# Patient Record
Sex: MALE | Race: WHITE | Employment: UNEMPLOYED | URBAN - METROPOLITAN AREA
[De-identification: names, ages, dates, MRNs, and addresses within clinical notes are randomized per-mention and may not be internally consistent; named-entity substitution may affect disease eponyms.]

---

## 2017-01-31 RX ORDER — LISINOPRIL AND HYDROCHLOROTHIAZIDE 25; 20 MG/1; MG/1
1 TABLET ORAL DAILY
Qty: 90 TABLET | Refills: 0 | Status: SHIPPED | OUTPATIENT
Start: 2017-01-31 | End: 2017-02-03 | Stop reason: SDUPTHER

## 2017-02-03 ENCOUNTER — OFFICE VISIT (OUTPATIENT)
Dept: FAMILY MEDICINE CLINIC | Age: 52
End: 2017-02-03

## 2017-02-03 VITALS
BODY MASS INDEX: 32.87 KG/M2 | DIASTOLIC BLOOD PRESSURE: 70 MMHG | OXYGEN SATURATION: 96 % | HEART RATE: 75 BPM | WEIGHT: 256 LBS | SYSTOLIC BLOOD PRESSURE: 108 MMHG

## 2017-02-03 DIAGNOSIS — L72.3 SEBACEOUS CYST: ICD-10-CM

## 2017-02-03 DIAGNOSIS — I10 ESSENTIAL HYPERTENSION: Primary | ICD-10-CM

## 2017-02-03 PROCEDURE — 99213 OFFICE O/P EST LOW 20 MIN: CPT | Performed by: FAMILY MEDICINE

## 2017-02-03 RX ORDER — LISINOPRIL AND HYDROCHLOROTHIAZIDE 25; 20 MG/1; MG/1
1 TABLET ORAL DAILY
Qty: 90 TABLET | Refills: 3 | Status: SHIPPED | OUTPATIENT
Start: 2017-02-03 | End: 2018-02-09 | Stop reason: SDUPTHER

## 2017-02-03 RX ORDER — SILDENAFIL 100 MG/1
TABLET, FILM COATED ORAL
Qty: 30 TABLET | Refills: 0 | Status: SHIPPED | OUTPATIENT
Start: 2017-02-03 | End: 2019-07-11

## 2017-02-27 ENCOUNTER — OFFICE VISIT (OUTPATIENT)
Dept: ENT CLINIC | Age: 52
End: 2017-02-27

## 2017-02-27 VITALS
DIASTOLIC BLOOD PRESSURE: 72 MMHG | WEIGHT: 258 LBS | HEIGHT: 75 IN | BODY MASS INDEX: 32.08 KG/M2 | SYSTOLIC BLOOD PRESSURE: 118 MMHG | RESPIRATION RATE: 20 BRPM

## 2017-02-27 DIAGNOSIS — L98.9 SKIN LESION: Primary | ICD-10-CM

## 2017-02-27 PROCEDURE — 99203 OFFICE O/P NEW LOW 30 MIN: CPT | Performed by: OTOLARYNGOLOGY

## 2017-02-27 ASSESSMENT — ENCOUNTER SYMPTOMS
ALLERGIC/IMMUNOLOGIC NEGATIVE: 1
FACIAL SWELLING: 1
EYES NEGATIVE: 1
RESPIRATORY NEGATIVE: 1
SORE THROAT: 0
SINUS PRESSURE: 0

## 2017-03-24 ENCOUNTER — HOSPITAL ENCOUNTER (OUTPATIENT)
Dept: SURGERY | Age: 52
Discharge: HOME | End: 2017-03-24
Attending: OTOLARYNGOLOGY | Admitting: OTOLARYNGOLOGY

## 2017-03-24 VITALS
DIASTOLIC BLOOD PRESSURE: 83 MMHG | TEMPERATURE: 97.2 F | WEIGHT: 261.8 LBS | HEIGHT: 75 IN | OXYGEN SATURATION: 97 % | RESPIRATION RATE: 14 BRPM | SYSTOLIC BLOOD PRESSURE: 119 MMHG | HEART RATE: 58 BPM | BODY MASS INDEX: 32.55 KG/M2

## 2017-03-24 PROCEDURE — 14040 TIS TRNFR F/C/C/M/N/A/G/H/F: CPT | Performed by: OTOLARYNGOLOGY

## 2017-03-24 RX ORDER — ACETAMINOPHEN 325 MG/1
650 TABLET ORAL EVERY 4 HOURS PRN
Status: DISCONTINUED | OUTPATIENT
Start: 2017-03-24 | End: 2017-03-25 | Stop reason: HOSPADM

## 2017-03-24 RX ORDER — CEPHALEXIN 500 MG/1
500 CAPSULE ORAL 3 TIMES DAILY
Qty: 15 CAPSULE | Refills: 0 | Status: SHIPPED | OUTPATIENT
Start: 2017-03-24 | End: 2018-01-17

## 2017-03-24 RX ORDER — ONDANSETRON 2 MG/ML
4 INJECTION INTRAMUSCULAR; INTRAVENOUS EVERY 6 HOURS PRN
Status: DISCONTINUED | OUTPATIENT
Start: 2017-03-24 | End: 2017-03-25 | Stop reason: HOSPADM

## 2017-03-24 RX ORDER — ACETAMINOPHEN AND CODEINE PHOSPHATE 300; 30 MG/1; MG/1
1-2 TABLET ORAL EVERY 6 HOURS PRN
Qty: 15 TABLET | Refills: 0 | Status: SHIPPED | OUTPATIENT
Start: 2017-03-24 | End: 2017-03-29 | Stop reason: ALTCHOICE

## 2017-03-24 ASSESSMENT — PAIN DESCRIPTION - DESCRIPTORS: DESCRIPTORS: DISCOMFORT

## 2017-03-24 ASSESSMENT — PAIN - FUNCTIONAL ASSESSMENT: PAIN_FUNCTIONAL_ASSESSMENT: 0-10

## 2017-03-29 ENCOUNTER — OFFICE VISIT (OUTPATIENT)
Dept: ENT CLINIC | Age: 52
End: 2017-03-29

## 2017-03-29 VITALS — HEIGHT: 75 IN | BODY MASS INDEX: 32.33 KG/M2 | WEIGHT: 260 LBS

## 2017-03-29 DIAGNOSIS — D23.30 CYST, DERMOID, FACE: Primary | ICD-10-CM

## 2017-03-29 PROCEDURE — 99024 POSTOP FOLLOW-UP VISIT: CPT | Performed by: OTOLARYNGOLOGY

## 2018-01-17 ENCOUNTER — OFFICE VISIT (OUTPATIENT)
Dept: FAMILY MEDICINE CLINIC | Age: 53
End: 2018-01-17

## 2018-01-17 VITALS
WEIGHT: 272 LBS | TEMPERATURE: 99 F | HEART RATE: 88 BPM | OXYGEN SATURATION: 98 % | BODY MASS INDEX: 34 KG/M2 | DIASTOLIC BLOOD PRESSURE: 70 MMHG | SYSTOLIC BLOOD PRESSURE: 102 MMHG

## 2018-01-17 DIAGNOSIS — J01.90 ACUTE BACTERIAL SINUSITIS: ICD-10-CM

## 2018-01-17 DIAGNOSIS — J45.901 ASTHMATIC BRONCHITIS WITH ACUTE EXACERBATION, UNSPECIFIED ASTHMA SEVERITY, UNSPECIFIED WHETHER PERSISTENT: Primary | ICD-10-CM

## 2018-01-17 DIAGNOSIS — B96.89 ACUTE BACTERIAL SINUSITIS: ICD-10-CM

## 2018-01-17 PROCEDURE — 99213 OFFICE O/P EST LOW 20 MIN: CPT | Performed by: FAMILY MEDICINE

## 2018-01-17 RX ORDER — AMOXICILLIN 500 MG/1
1000 CAPSULE ORAL 2 TIMES DAILY
Qty: 40 CAPSULE | Refills: 0 | Status: SHIPPED | OUTPATIENT
Start: 2018-01-17 | End: 2018-01-27

## 2018-01-17 RX ORDER — PREDNISONE 20 MG/1
TABLET ORAL
Qty: 15 TABLET | Refills: 0 | Status: SHIPPED | OUTPATIENT
Start: 2018-01-17 | End: 2018-06-08

## 2018-01-17 ASSESSMENT — ENCOUNTER SYMPTOMS
COUGH: 1
RHINORRHEA: 1

## 2018-01-17 NOTE — PROGRESS NOTES
Subjective:      Patient ID: Kristen Lara is a 45 y.o. male. Patient presents for acute medical problem. Medical assistant notes reviewed. URI    This is a new problem. Episode onset: since January 13. Associated symptoms include coughing, headaches, rhinorrhea and sneezing. Associated symptoms comments: Chills, body aches. Treatments tried: Coricidin HBP. The treatment provided mild relief. Patient originally was having cough and congestion now in the last several days is up to his head. He's having headaches associated with this. Patient denies any fevers but has been having chills. His cough is productive in nature. Review of Systems   HENT: Positive for rhinorrhea and sneezing. Respiratory: Positive for cough. Neurological: Positive for headaches. No Known Allergies    Objective:   Physical Exam   Constitutional: He appears well-developed and well-nourished. He is cooperative. HENT:   Right Ear: Tympanic membrane and ear canal normal.   Left Ear: Tympanic membrane and ear canal normal.   Nose: Mucosal edema and rhinorrhea present. Right sinus exhibits no maxillary sinus tenderness and no frontal sinus tenderness. Left sinus exhibits no maxillary sinus tenderness and no frontal sinus tenderness. Mouth/Throat: Oropharynx is clear and moist and mucous membranes are normal.   Neck: Neck supple. Pulmonary/Chest: Effort normal. No respiratory distress. He has wheezes (diffuse). Lymphadenopathy:     He has no cervical adenopathy. Neurological: He is alert. Assessment:      Ravindra Cantrell was seen today for uri. Diagnoses and all orders for this visit:    Asthmatic bronchitis with acute exacerbation, unspecified asthma severity, unspecified whether persistent    Acute bacterial sinusitis    Other orders  -     amoxicillin (AMOXIL) 500 MG capsule;  Take 2 capsules by mouth 2 times daily for 10 days  -     predniSONE (DELTASONE) 20 MG tablet; 1 TID for 3 day then 1 BID            Plan:

## 2018-02-09 RX ORDER — LISINOPRIL AND HYDROCHLOROTHIAZIDE 25; 20 MG/1; MG/1
1 TABLET ORAL DAILY
Qty: 30 TABLET | Refills: 0 | Status: SHIPPED | OUTPATIENT
Start: 2018-02-09 | End: 2018-03-14 | Stop reason: SDUPTHER

## 2018-03-14 RX ORDER — LISINOPRIL AND HYDROCHLOROTHIAZIDE 25; 20 MG/1; MG/1
1 TABLET ORAL DAILY
Qty: 30 TABLET | Refills: 0 | Status: SHIPPED | OUTPATIENT
Start: 2018-03-14 | End: 2018-04-12 | Stop reason: SDUPTHER

## 2018-05-18 ENCOUNTER — TELEPHONE (OUTPATIENT)
Dept: FAMILY MEDICINE CLINIC | Age: 53
End: 2018-05-18

## 2018-05-18 RX ORDER — LISINOPRIL AND HYDROCHLOROTHIAZIDE 25; 20 MG/1; MG/1
TABLET ORAL
Qty: 30 TABLET | Refills: 0 | Status: SHIPPED | OUTPATIENT
Start: 2018-05-18 | End: 2018-06-08 | Stop reason: SDUPTHER

## 2018-06-08 ENCOUNTER — OFFICE VISIT (OUTPATIENT)
Dept: FAMILY MEDICINE CLINIC | Age: 53
End: 2018-06-08

## 2018-06-08 VITALS
BODY MASS INDEX: 34.91 KG/M2 | HEIGHT: 74 IN | WEIGHT: 272 LBS | SYSTOLIC BLOOD PRESSURE: 128 MMHG | HEART RATE: 77 BPM | DIASTOLIC BLOOD PRESSURE: 74 MMHG | OXYGEN SATURATION: 97 %

## 2018-06-08 DIAGNOSIS — G56.02 CARPAL TUNNEL SYNDROME OF LEFT WRIST: ICD-10-CM

## 2018-06-08 DIAGNOSIS — I10 ESSENTIAL HYPERTENSION: Primary | ICD-10-CM

## 2018-06-08 DIAGNOSIS — Z13.220 LIPID SCREENING: ICD-10-CM

## 2018-06-08 DIAGNOSIS — M72.0 DUPUYTREN'S CONTRACTURE OF LEFT HAND: ICD-10-CM

## 2018-06-08 DIAGNOSIS — Z12.11 SCREENING FOR COLON CANCER: ICD-10-CM

## 2018-06-08 DIAGNOSIS — Z12.5 PROSTATE CANCER SCREENING: ICD-10-CM

## 2018-06-08 PROCEDURE — 99214 OFFICE O/P EST MOD 30 MIN: CPT | Performed by: FAMILY MEDICINE

## 2018-06-08 RX ORDER — LISINOPRIL AND HYDROCHLOROTHIAZIDE 25; 20 MG/1; MG/1
TABLET ORAL
Qty: 30 TABLET | Refills: 11 | Status: SHIPPED | OUTPATIENT
Start: 2018-06-08 | End: 2019-07-11 | Stop reason: SDUPTHER

## 2018-06-13 ENCOUNTER — HOSPITAL ENCOUNTER (OUTPATIENT)
Dept: OTHER | Age: 53
Discharge: HOME | End: 2018-06-13
Attending: FAMILY MEDICINE | Admitting: FAMILY MEDICINE

## 2018-06-13 DIAGNOSIS — Z13.220 LIPID SCREENING: ICD-10-CM

## 2018-06-13 DIAGNOSIS — I10 ESSENTIAL HYPERTENSION: ICD-10-CM

## 2018-06-13 LAB
A/G RATIO: 1.3 (ref 1.1–2.2)
ALBUMIN SERPL-MCNC: 4.8 G/DL (ref 3.4–5)
ALP BLD-CCNC: 92 U/L (ref 40–129)
ALT SERPL-CCNC: 38 U/L (ref 10–40)
ANION GAP SERPL CALCULATED.3IONS-SCNC: 14 MMOL/L (ref 3–16)
AST SERPL-CCNC: 27 U/L (ref 15–37)
BILIRUB SERPL-MCNC: 0.7 MG/DL (ref 0–1)
BUN BLDV-MCNC: 11 MG/DL (ref 7–20)
CALCIUM SERPL-MCNC: 10 MG/DL (ref 8.3–10.6)
CHLORIDE BLD-SCNC: 99 MMOL/L (ref 99–110)
CHOLESTEROL, TOTAL: 185 MG/DL (ref 0–199)
CO2: 27 MMOL/L (ref 21–32)
CREAT SERPL-MCNC: 0.9 MG/DL (ref 0.9–1.3)
GFR AFRICAN AMERICAN: >60
GFR NON-AFRICAN AMERICAN: >60
GLOBULIN: 3.6 G/DL
GLUCOSE BLD-MCNC: 91 MG/DL (ref 70–99)
HDLC SERPL-MCNC: 43 MG/DL (ref 40–60)
LDL CHOLESTEROL CALCULATED: 109 MG/DL
POTASSIUM SERPL-SCNC: 4.3 MMOL/L (ref 3.5–5.1)
PROSTATE SPECIFIC ANTIGEN: 0.41 NG/ML (ref 0–4)
SODIUM BLD-SCNC: 140 MMOL/L (ref 136–145)
TOTAL PROTEIN: 8.4 G/DL (ref 6.4–8.2)
TRIGL SERPL-MCNC: 166 MG/DL (ref 0–150)
VLDLC SERPL CALC-MCNC: 33 MG/DL

## 2018-08-01 ENCOUNTER — CLINICAL DOCUMENTATION (OUTPATIENT)
Dept: OTHER | Facility: CLINIC | Age: 53
End: 2018-08-01

## 2018-08-01 ENCOUNTER — HOSPITAL ENCOUNTER (OUTPATIENT)
Dept: ENDOSCOPY | Age: 53
Discharge: HOME | End: 2018-08-01
Attending: INTERNAL MEDICINE | Admitting: INTERNAL MEDICINE

## 2018-08-01 RX ORDER — HYDROMORPHONE HCL 110MG/55ML
0.5 PATIENT CONTROLLED ANALGESIA SYRINGE INTRAVENOUS EVERY 5 MIN PRN
Status: DISCONTINUED | OUTPATIENT
Start: 2018-08-01 | End: 2018-08-02 | Stop reason: HOSPADM

## 2018-08-01 RX ORDER — ONDANSETRON 2 MG/ML
4 INJECTION INTRAMUSCULAR; INTRAVENOUS
Status: ACTIVE | OUTPATIENT
Start: 2018-08-01 | End: 2018-08-01

## 2018-08-01 RX ORDER — SODIUM CHLORIDE 0.9 % (FLUSH) 0.9 %
10 SYRINGE (ML) INJECTION EVERY 12 HOURS SCHEDULED
Status: CANCELLED | OUTPATIENT
Start: 2018-08-01

## 2018-08-01 RX ORDER — MEPERIDINE HYDROCHLORIDE 25 MG/ML
12.5 INJECTION INTRAMUSCULAR; INTRAVENOUS; SUBCUTANEOUS EVERY 5 MIN PRN
Status: DISCONTINUED | OUTPATIENT
Start: 2018-08-01 | End: 2018-08-02 | Stop reason: HOSPADM

## 2018-08-01 RX ORDER — LIDOCAINE HYDROCHLORIDE 10 MG/ML
1 INJECTION, SOLUTION EPIDURAL; INFILTRATION; INTRACAUDAL; PERINEURAL
Status: CANCELLED | OUTPATIENT
Start: 2018-08-01 | End: 2018-08-01

## 2018-08-01 RX ORDER — LABETALOL HYDROCHLORIDE 5 MG/ML
5 INJECTION, SOLUTION INTRAVENOUS EVERY 10 MIN PRN
Status: DISCONTINUED | OUTPATIENT
Start: 2018-08-01 | End: 2018-08-02 | Stop reason: HOSPADM

## 2018-08-01 RX ORDER — SODIUM CHLORIDE, SODIUM LACTATE, POTASSIUM CHLORIDE, CALCIUM CHLORIDE 600; 310; 30; 20 MG/100ML; MG/100ML; MG/100ML; MG/100ML
INJECTION, SOLUTION INTRAVENOUS CONTINUOUS
Status: CANCELLED | OUTPATIENT
Start: 2018-08-01

## 2018-08-01 RX ORDER — OXYCODONE HYDROCHLORIDE AND ACETAMINOPHEN 5; 325 MG/1; MG/1
1 TABLET ORAL
Status: ACTIVE | OUTPATIENT
Start: 2018-08-01 | End: 2018-08-01

## 2018-08-01 RX ORDER — SODIUM CHLORIDE 0.9 % (FLUSH) 0.9 %
10 SYRINGE (ML) INJECTION PRN
Status: CANCELLED | OUTPATIENT
Start: 2018-08-01

## 2018-08-01 RX ORDER — HYDRALAZINE HYDROCHLORIDE 20 MG/ML
5 INJECTION INTRAMUSCULAR; INTRAVENOUS EVERY 10 MIN PRN
Status: DISCONTINUED | OUTPATIENT
Start: 2018-08-01 | End: 2018-08-02 | Stop reason: HOSPADM

## 2018-08-01 NOTE — ANESTHESIA POST-OP
Anesthesia Post-op Note    Patient: Mario Rock  MRN: [id number]  YOB: 1965  Date of evaluation: 8/1/2018  Time:  8:55 AM     Procedure(s) Performed:     Last Vitals: There were no vitals taken for this visit.     Paige Phase I:      Paige Phase II:      Anesthesia Post Evaluation    Final anesthesia type: MAC  Patient location during evaluation: PACU  Patient participation: complete - patient participated  Level of consciousness: awake  Airway patency: patent  Nausea & Vomiting: no vomiting and no nausea  Complications: no  Cardiovascular status: hemodynamically stable  Respiratory status: acceptable  Hydration status: stable        Adriana Andre MD  8:55 AM

## 2019-06-12 RX ORDER — LISINOPRIL AND HYDROCHLOROTHIAZIDE 25; 20 MG/1; MG/1
TABLET ORAL
Qty: 30 TABLET | Refills: 0 | Status: SHIPPED | OUTPATIENT
Start: 2019-06-12 | End: 2019-07-11

## 2019-07-11 ENCOUNTER — OFFICE VISIT (OUTPATIENT)
Dept: FAMILY MEDICINE CLINIC | Age: 54
End: 2019-07-11
Payer: COMMERCIAL

## 2019-07-11 VITALS
WEIGHT: 282 LBS | BODY MASS INDEX: 35.96 KG/M2 | HEART RATE: 70 BPM | SYSTOLIC BLOOD PRESSURE: 130 MMHG | OXYGEN SATURATION: 97 % | DIASTOLIC BLOOD PRESSURE: 80 MMHG

## 2019-07-11 DIAGNOSIS — Z12.5 SCREENING FOR MALIGNANT NEOPLASM OF PROSTATE: ICD-10-CM

## 2019-07-11 DIAGNOSIS — Z13.220 SCREENING FOR LIPID DISORDERS: ICD-10-CM

## 2019-07-11 DIAGNOSIS — I10 ESSENTIAL HYPERTENSION: Primary | ICD-10-CM

## 2019-07-11 PROCEDURE — 99213 OFFICE O/P EST LOW 20 MIN: CPT | Performed by: FAMILY MEDICINE

## 2019-07-11 RX ORDER — LISINOPRIL AND HYDROCHLOROTHIAZIDE 25; 20 MG/1; MG/1
TABLET ORAL
Qty: 90 TABLET | Refills: 3 | Status: SHIPPED | OUTPATIENT
Start: 2019-07-11 | End: 2020-08-03 | Stop reason: SDUPTHER

## 2019-07-11 ASSESSMENT — ENCOUNTER SYMPTOMS
CHEST TIGHTNESS: 0
SHORTNESS OF BREATH: 0
CONSTIPATION: 0
SINUS PRESSURE: 0
DIARRHEA: 0
RHINORRHEA: 0
ABDOMINAL PAIN: 0
WHEEZING: 0
BACK PAIN: 0

## 2019-07-25 ENCOUNTER — TELEPHONE (OUTPATIENT)
Dept: FAMILY MEDICINE CLINIC | Age: 54
End: 2019-07-25

## 2019-07-25 NOTE — TELEPHONE ENCOUNTER
Patient advised his lab results came back ok per Dr. Bharti Hernadez. He will make his next appt with him.

## 2020-08-03 RX ORDER — LISINOPRIL AND HYDROCHLOROTHIAZIDE 25; 20 MG/1; MG/1
TABLET ORAL
Qty: 30 TABLET | Refills: 0 | Status: SHIPPED | OUTPATIENT
Start: 2020-08-03 | End: 2020-09-09 | Stop reason: SDUPTHER

## 2020-08-03 NOTE — TELEPHONE ENCOUNTER
Medication:   Requested Prescriptions      No prescriptions requested or ordered in this encounter      Last Filled:  7/11/19    Patient Phone Number: [phone] (home)     Last appt: 7/11/2019   Next appt: Visit date not found - was due back July 2020    Last OARRS: No flowsheet data found.   PDMP Monitoring:    Last PDMP Devora Standard as Reviewed [facility]):  Review User Review Instant Review Result          Preferred Pharmacy:   [address] [phone] [facility] [phone]  [facility] [address] [facility] [address]  Phone: [phone] Fax: [phone]

## 2020-08-03 NOTE — TELEPHONE ENCOUNTER
lisinopril-hydrochlorothiazide (PRINZIDE;ZESTORETIC) 20-25 MG per tablet  90 tablet  3  7/11/2019      Sig: TAKE 1 TABLET BY MOUTH EVERY DAY       Jeffrey in chart      Provider out of the office

## 2020-09-09 RX ORDER — LISINOPRIL AND HYDROCHLOROTHIAZIDE 25; 20 MG/1; MG/1
TABLET ORAL
Qty: 30 TABLET | Refills: 0 | Status: SHIPPED | OUTPATIENT
Start: 2020-09-09 | End: 2020-09-09

## 2020-09-09 NOTE — TELEPHONE ENCOUNTER
Disp  Refills  Start  End     lisinopril-hydroCHLOROthiazide (PRINZIDE;ZESTORETIC) 20-25 MG per tablet  30 tablet  0  8/3/2020      Sig: TAKE 1 TABLET BY MOUTH EVERY DAY       Pharmacy:  [facility] [address] [phone] - F [phone]         Please advise

## 2020-09-10 RX ORDER — LISINOPRIL AND HYDROCHLOROTHIAZIDE 25; 20 MG/1; MG/1
TABLET ORAL
Qty: 90 TABLET | Refills: 0 | Status: SHIPPED | OUTPATIENT
Start: 2020-09-10 | End: 2020-09-18 | Stop reason: SDUPTHER

## 2020-09-18 ENCOUNTER — OFFICE VISIT (OUTPATIENT)
Dept: FAMILY MEDICINE CLINIC | Age: 55
End: 2020-09-18
Payer: COMMERCIAL

## 2020-09-18 VITALS
TEMPERATURE: 99.2 F | SYSTOLIC BLOOD PRESSURE: 136 MMHG | DIASTOLIC BLOOD PRESSURE: 80 MMHG | WEIGHT: 277 LBS | BODY MASS INDEX: 35.33 KG/M2

## 2020-09-18 PROCEDURE — 99396 PREV VISIT EST AGE 40-64: CPT | Performed by: FAMILY MEDICINE

## 2020-09-18 RX ORDER — LISINOPRIL AND HYDROCHLOROTHIAZIDE 25; 20 MG/1; MG/1
TABLET ORAL
Qty: 90 TABLET | Refills: 3 | Status: SHIPPED | OUTPATIENT
Start: 2020-09-18 | End: 2021-01-14 | Stop reason: SDUPTHER

## 2020-09-18 ASSESSMENT — ENCOUNTER SYMPTOMS
CONSTIPATION: 0
SHORTNESS OF BREATH: 0
CHEST TIGHTNESS: 0
RHINORRHEA: 0
DIARRHEA: 0
WHEEZING: 0
BACK PAIN: 1

## 2020-09-18 NOTE — PROGRESS NOTES
2020    Raynette Felty (:  1965) is a 60 y.o. male, here for a preventive medicine evaluation. Patient Active Problem List   Diagnosis    HTN (hypertension)    Impotence of organic origin    Skin lesion    Cyst, dermoid, face    Carpal tunnel syndrome of left wrist    Dupuytren's contracture of left hand       Review of Systems   Constitutional: Negative for activity change, fatigue and unexpected weight change. HENT: Negative for congestion, postnasal drip and rhinorrhea. Respiratory: Negative for chest tightness, shortness of breath and wheezing. Cardiovascular: Negative for chest pain, palpitations and leg swelling. Gastrointestinal: Negative for constipation and diarrhea. Genitourinary: Negative for difficulty urinating. Musculoskeletal: Positive for arthralgias (knees, ankles) and back pain ( occ'l). Allergic/Immunologic: Negative for environmental allergies. Neurological: Negative for dizziness, light-headedness and headaches. Psychiatric/Behavioral: Negative for dysphoric mood and sleep disturbance. The patient is not nervous/anxious. Prior to Visit Medications    Medication Sig Taking?  Authorizing Provider   lisinopril-hydroCHLOROthiazide (PRINZIDE;ZESTORETIC) 20-25 MG per tablet One daily Yes Nadeem Solis MD        No Known Allergies    Past Medical History:   Diagnosis Date    HTN (hypertension)        Past Surgical History:   Procedure Laterality Date    EXCISION OF FACIAL MASS Right 2017    right cheek    KNEE ARTHROSCOPY Right        Social History     Socioeconomic History    Marital status: Single     Spouse name: Not on file    Number of children: Not on file    Years of education: Not on file    Highest education level: Not on file   Occupational History    Not on file   Social Needs    Financial resource strain: Not on file    Food insecurity     Worry: Not on file     Inability: Not on file   [employer] needs Medical: Not on file     Non-medical: Not on file   Tobacco Use    Smoking status: Never Smoker    Smokeless tobacco: Current User     Types: Chew   Substance and Sexual Activity    Alcohol use: Yes     Alcohol/week: 12.0 standard drinks     Types: 12 Cans of beer per week    Drug use: No    Sexual activity: Not on file   Lifestyle    Physical activity     Days per week: Not on file     Minutes per session: Not on file    Stress: Not on file   Relationships    Social connections     Talks on phone: Not on file     Gets together: Not on file     Attends Mosque service: Not on file     Active member of club or organization: Not on file     Attends meetings of clubs or organizations: Not on file     Relationship status: Not on file    Intimate partner violence     Fear of current or ex partner: Not on file     Emotionally abused: Not on file     Physically abused: Not on file     Forced sexual activity: Not on file   Other Topics Concern    Not on file   Social History Narrative    Not on file        Family History   Problem Relation Age of Onset    High Blood Pressure Mother     Cancer Father     Diabetes Father     High Blood Pressure Father     High Cholesterol Father     Heart Disease Father        ADVANCE DIRECTIVE: N, <no information>    Vitals:    09/18/20 1511   BP: 136/80   Temp: 99.2 °F (37.3 °C)   Weight: 277 lb (125.6 kg)     Estimated body mass index is 35.33 kg/m² as calculated from the following:    Height as of 6/8/18: 6' 2.25\" (1.886 m). Weight as of this encounter: 277 lb (125.6 kg). Physical Exam  Constitutional:       Appearance: He is well-developed. HENT:      Head: Normocephalic and atraumatic. Right Ear: External ear normal.      Left Ear: External ear normal.      Nose: Nose normal.   Neck:      Musculoskeletal: Normal range of motion and neck supple. Thyroid: No thyromegaly. Vascular: No JVD. Trachea: No tracheal deviation.    Cardiovascular: Rate and Rhythm: Normal rate and regular rhythm. Heart sounds: Normal heart sounds. Pulmonary:      Effort: Pulmonary effort is normal. No respiratory distress. Breath sounds: Normal breath sounds. No rales. Lymphadenopathy:      Cervical: No cervical adenopathy. Skin:     General: Skin is warm and dry. Neurological:      General: No focal deficit present. Mental Status: He is alert and oriented to person, place, and time. Psychiatric:         Mood and Affect: Mood normal.         Behavior: Behavior normal.         No flowsheet data found. Lab Results   Component Value Date    CHOL 185 06/13/2018    CHOL 176 10/11/2014    TRIG 166 06/13/2018    TRIG 113 10/11/2014    HDL 43 06/13/2018    HDL 38 10/11/2014    LDLCALC 109 06/13/2018    LDLCALC 115 10/11/2014    GLUCOSE 91 06/13/2018       The 10-year ASCVD risk score (Kayy Littlejohn, et al., 2013) is: 9%    Values used to calculate the score:      Age: 60 years      Sex: Male      Is Non- : No      Diabetic: No      Tobacco smoker: No      Systolic Blood Pressure: 116 mmHg      Is BP treated: Yes      HDL Cholesterol: 43 mg/dL      Total Cholesterol: 185 mg/dL    Immunization History   Administered Date(s) Administered    Tdap (Boostrix, Adacel) 09/17/2014       Health Maintenance   Topic Date Due    Hepatitis C screen  1965    HIV screen  08/07/1978    Shingles Vaccine (1 of 2) 08/07/2013    Potassium monitoring  06/13/2019    Creatinine monitoring  06/13/2019    Flu vaccine (1) 09/01/2020    Lipid screen  06/13/2023    Colon cancer screen colonoscopy  08/01/2023    DTaP/Tdap/Td vaccine (2 - Td) 09/17/2024    Hepatitis A vaccine  Aged Out    Hepatitis B vaccine  Aged Out    Hib vaccine  Aged Out    Meningococcal (ACWY) vaccine  Aged Out    Pneumococcal 0-64 years Vaccine  Aged Out       ASSESSMENT/PLAN:  1.  Essential hypertension  In good control  - lisinopril-hydroCHLOROthiazide (PRINZIDE;ZESTORETIC) 20-25 MG per tablet; One daily  Dispense: 90 tablet; Refill: 3    2. Mixed hyperlipidemia  Mild elevation of LDL cholesterol and elevation of triglycerides on last year's lab work. - Comprehensive Metabolic Panel, Fasting; Future  - CBC Auto Differential; Future  - Lipid, Fasting; Future  - TSH with Reflex; Future    3. Screening for malignant neoplasm of prostate  - Psa screening; Future      Return in about 1 year (around 9/18/2021). An electronic signature was used to authenticate this note.     --Codey Chow MD on 9/18/2020 at 3:29 PM

## 2020-10-08 ENCOUNTER — HOSPITAL ENCOUNTER (OUTPATIENT)
Age: 55
Discharge: HOME | End: 2020-10-08
Payer: COMMERCIAL

## 2020-10-08 LAB
A/G RATIO: 1.3 (ref 1.1–2.2)
ALBUMIN SERPL-MCNC: 4.5 G/DL (ref 3.4–5)
ALP BLD-CCNC: 92 U/L (ref 40–129)
ALT SERPL-CCNC: 41 U/L (ref 10–40)
ANION GAP SERPL CALCULATED.3IONS-SCNC: 12 MMOL/L (ref 3–16)
AST SERPL-CCNC: 34 U/L (ref 15–37)
BASOPHILS ABSOLUTE: 0 K/UL (ref 0–0.2)
BASOPHILS RELATIVE PERCENT: 0.5 %
BILIRUB SERPL-MCNC: 0.7 MG/DL (ref 0–1)
BUN BLDV-MCNC: 11 MG/DL (ref 7–20)
CALCIUM SERPL-MCNC: 9.8 MG/DL (ref 8.3–10.6)
CHLORIDE BLD-SCNC: 103 MMOL/L (ref 99–110)
CHOLESTEROL, FASTING: 183 MG/DL (ref 0–199)
CO2: 25 MMOL/L (ref 21–32)
CREAT SERPL-MCNC: 0.8 MG/DL (ref 0.9–1.3)
EOSINOPHILS ABSOLUTE: 0.1 K/UL (ref 0–0.6)
EOSINOPHILS RELATIVE PERCENT: 1.5 %
GFR AFRICAN AMERICAN: >60
GFR NON-AFRICAN AMERICAN: >60
GLOBULIN: 3.4 G/DL
GLUCOSE FASTING: 103 MG/DL (ref 70–99)
HCT VFR BLD CALC: 45.3 % (ref 40.5–52.5)
HDLC SERPL-MCNC: 40 MG/DL (ref 40–60)
HEMOGLOBIN: 15.7 G/DL (ref 13.5–17.5)
LDL CHOLESTEROL CALCULATED: 127 MG/DL
LYMPHOCYTES ABSOLUTE: 1.1 K/UL (ref 1–5.1)
LYMPHOCYTES RELATIVE PERCENT: 19.5 %
MCH RBC QN AUTO: 31.2 PG (ref 26–34)
MCHC RBC AUTO-ENTMCNC: 34.6 G/DL (ref 31–36)
MCV RBC AUTO: 90.1 FL (ref 80–100)
MONOCYTES ABSOLUTE: 0.5 K/UL (ref 0–1.3)
MONOCYTES RELATIVE PERCENT: 9.6 %
NEUTROPHILS ABSOLUTE: 3.8 K/UL (ref 1.7–7.7)
NEUTROPHILS RELATIVE PERCENT: 68.9 %
PDW BLD-RTO: 13.1 % (ref 12.4–15.4)
PLATELET # BLD: 224 K/UL (ref 135–450)
PMV BLD AUTO: 9.1 FL (ref 5–10.5)
POTASSIUM SERPL-SCNC: 4.8 MMOL/L (ref 3.5–5.1)
PROSTATE SPECIFIC ANTIGEN: 0.36 NG/ML (ref 0–4)
RBC # BLD: 5.02 M/UL (ref 4.2–5.9)
SODIUM BLD-SCNC: 140 MMOL/L (ref 136–145)
TOTAL PROTEIN: 7.9 G/DL (ref 6.4–8.2)
TRIGLYCERIDE, FASTING: 81 MG/DL (ref 0–150)
TSH REFLEX: 1.34 UIU/ML (ref 0.27–4.2)
VLDLC SERPL CALC-MCNC: 16 MG/DL
WBC # BLD: 5.5 K/UL (ref 4–11)

## 2020-10-08 PROCEDURE — 36415 COLL VENOUS BLD VENIPUNCTURE: CPT

## 2020-10-08 PROCEDURE — 80053 COMPREHEN METABOLIC PANEL: CPT

## 2020-10-08 PROCEDURE — 84443 ASSAY THYROID STIM HORMONE: CPT

## 2020-10-08 PROCEDURE — 80061 LIPID PANEL: CPT

## 2020-10-08 PROCEDURE — 85025 COMPLETE CBC W/AUTO DIFF WBC: CPT

## 2020-10-08 PROCEDURE — 84153 ASSAY OF PSA TOTAL: CPT

## 2021-01-14 DIAGNOSIS — I10 ESSENTIAL HYPERTENSION: ICD-10-CM

## 2021-01-14 RX ORDER — LISINOPRIL AND HYDROCHLOROTHIAZIDE 25; 20 MG/1; MG/1
TABLET ORAL
Qty: 90 TABLET | Refills: 3 | Status: SHIPPED | OUTPATIENT
Start: 2021-01-14 | End: 2021-08-17

## 2021-01-14 NOTE — TELEPHONE ENCOUNTER
Disp Refills Start End    lisinopril-hydroCHLOROthiazide (PRINZIDE;ZESTORETIC) 20-25 MG per tablet 90 tablet 3 9/18/2020     Sig: One daily      [facility] [address] -  [phone]       Please advise

## 2021-07-14 ENCOUNTER — HOSPITAL ENCOUNTER (EMERGENCY)
Age: 56
Discharge: HOME | End: 2021-07-14
Attending: EMERGENCY MEDICINE
Payer: COMMERCIAL

## 2021-07-14 ENCOUNTER — HOSPITAL ENCOUNTER (OUTPATIENT)
Dept: GENERAL RADIOLOGY | Age: 56
Discharge: HOME | End: 2021-07-14
Payer: COMMERCIAL

## 2021-07-14 ENCOUNTER — APPOINTMENT (OUTPATIENT)
Dept: CT IMAGING | Age: 56
End: 2021-07-14
Payer: COMMERCIAL

## 2021-07-14 ENCOUNTER — OFFICE VISIT (OUTPATIENT)
Dept: FAMILY MEDICINE CLINIC | Age: 56
End: 2021-07-14
Payer: COMMERCIAL

## 2021-07-14 ENCOUNTER — HOSPITAL ENCOUNTER (OUTPATIENT)
Age: 56
Discharge: HOME | End: 2021-07-14
Payer: COMMERCIAL

## 2021-07-14 VITALS
OXYGEN SATURATION: 96 % | SYSTOLIC BLOOD PRESSURE: 142 MMHG | DIASTOLIC BLOOD PRESSURE: 82 MMHG | HEART RATE: 87 BPM | HEIGHT: 74 IN | BODY MASS INDEX: 31.65 KG/M2 | WEIGHT: 246.6 LBS | RESPIRATION RATE: 25 BRPM | TEMPERATURE: 99.3 F

## 2021-07-14 VITALS — TEMPERATURE: 100.5 F | BODY MASS INDEX: 31.37 KG/M2 | OXYGEN SATURATION: 97 % | WEIGHT: 246 LBS | HEART RATE: 101 BPM

## 2021-07-14 DIAGNOSIS — J20.9 ACUTE BRONCHITIS DUE TO INFECTION: ICD-10-CM

## 2021-07-14 DIAGNOSIS — Z20.822 SUSPECTED COVID-19 VIRUS INFECTION: ICD-10-CM

## 2021-07-14 DIAGNOSIS — J20.9 ACUTE BRONCHITIS DUE TO INFECTION: Primary | ICD-10-CM

## 2021-07-14 DIAGNOSIS — R91.8 MASS OF LEFT LUNG: Primary | ICD-10-CM

## 2021-07-14 LAB
A/G RATIO: 0.7 (ref 1.1–2.2)
ALBUMIN SERPL-MCNC: 3.3 G/DL (ref 3.4–5)
ALP BLD-CCNC: 157 U/L (ref 40–129)
ALT SERPL-CCNC: 31 U/L (ref 10–40)
ANION GAP SERPL CALCULATED.3IONS-SCNC: 12 MMOL/L (ref 3–16)
AST SERPL-CCNC: 21 U/L (ref 15–37)
BASOPHILS ABSOLUTE: 0.1 K/UL (ref 0–0.2)
BASOPHILS RELATIVE PERCENT: 0.5 %
BILIRUB SERPL-MCNC: 0.5 MG/DL (ref 0–1)
BUN BLDV-MCNC: 12 MG/DL (ref 7–20)
CALCIUM SERPL-MCNC: 9.4 MG/DL (ref 8.3–10.6)
CHLORIDE BLD-SCNC: 103 MMOL/L (ref 99–110)
CO2: 22 MMOL/L (ref 21–32)
CREAT SERPL-MCNC: 0.6 MG/DL (ref 0.9–1.3)
EOSINOPHILS ABSOLUTE: 0 K/UL (ref 0–0.6)
EOSINOPHILS RELATIVE PERCENT: 0.4 %
GFR AFRICAN AMERICAN: >60
GFR NON-AFRICAN AMERICAN: >60
GLOBULIN: 4.8 G/DL
GLUCOSE BLD-MCNC: 96 MG/DL (ref 70–99)
HCT VFR BLD CALC: 40.1 % (ref 40.5–52.5)
HEMOGLOBIN: 13.3 G/DL (ref 13.5–17.5)
LYMPHOCYTES ABSOLUTE: 1.1 K/UL (ref 1–5.1)
LYMPHOCYTES RELATIVE PERCENT: 10.3 %
MCH RBC QN AUTO: 28.4 PG (ref 26–34)
MCHC RBC AUTO-ENTMCNC: 33.1 G/DL (ref 31–36)
MCV RBC AUTO: 85.6 FL (ref 80–100)
MONOCYTES ABSOLUTE: 1.2 K/UL (ref 0–1.3)
MONOCYTES RELATIVE PERCENT: 11 %
NEUTROPHILS ABSOLUTE: 8.2 K/UL (ref 1.7–7.7)
NEUTROPHILS RELATIVE PERCENT: 77.8 %
PDW BLD-RTO: 13.3 % (ref 12.4–15.4)
PLATELET # BLD: 426 K/UL (ref 135–450)
PMV BLD AUTO: 7.8 FL (ref 5–10.5)
POTASSIUM SERPL-SCNC: 4.4 MMOL/L (ref 3.5–5.1)
PRO-BNP: 239 PG/ML (ref 0–124)
RBC # BLD: 4.68 M/UL (ref 4.2–5.9)
SODIUM BLD-SCNC: 137 MMOL/L (ref 136–145)
TOTAL PROTEIN: 8.1 G/DL (ref 6.4–8.2)
TROPONIN: <0.01 NG/ML
WBC # BLD: 10.6 K/UL (ref 4–11)

## 2021-07-14 PROCEDURE — 93005 ELECTROCARDIOGRAM TRACING: CPT | Performed by: PHYSICIAN ASSISTANT

## 2021-07-14 PROCEDURE — 71046 X-RAY EXAM CHEST 2 VIEWS: CPT

## 2021-07-14 PROCEDURE — 84484 ASSAY OF TROPONIN QUANT: CPT

## 2021-07-14 PROCEDURE — 83880 ASSAY OF NATRIURETIC PEPTIDE: CPT

## 2021-07-14 PROCEDURE — 6360000004 HC RX CONTRAST MEDICATION: Performed by: PHYSICIAN ASSISTANT

## 2021-07-14 PROCEDURE — 99213 OFFICE O/P EST LOW 20 MIN: CPT | Performed by: NURSE PRACTITIONER

## 2021-07-14 PROCEDURE — 80053 COMPREHEN METABOLIC PANEL: CPT

## 2021-07-14 PROCEDURE — 85025 COMPLETE CBC W/AUTO DIFF WBC: CPT

## 2021-07-14 PROCEDURE — 99282 EMERGENCY DEPT VISIT SF MDM: CPT

## 2021-07-14 PROCEDURE — 36415 COLL VENOUS BLD VENIPUNCTURE: CPT

## 2021-07-14 PROCEDURE — 71260 CT THORAX DX C+: CPT

## 2021-07-14 RX ORDER — AZITHROMYCIN 250 MG/1
250 TABLET, FILM COATED ORAL SEE ADMIN INSTRUCTIONS
Qty: 6 TABLET | Refills: 0 | Status: SHIPPED | OUTPATIENT
Start: 2021-07-14 | End: 2021-07-19

## 2021-07-14 RX ORDER — ALBUTEROL SULFATE 90 UG/1
2 AEROSOL, METERED RESPIRATORY (INHALATION) 4 TIMES DAILY PRN
Qty: 1 INHALER | Refills: 0 | Status: SHIPPED | OUTPATIENT
Start: 2021-07-14 | End: 2021-08-17

## 2021-07-14 RX ADMIN — IOPAMIDOL 75 ML: 755 INJECTION, SOLUTION INTRAVENOUS at 19:53

## 2021-07-14 ASSESSMENT — ENCOUNTER SYMPTOMS
COUGH: 1
SHORTNESS OF BREATH: 1
RHINORRHEA: 0
VOMITING: 0
ABDOMINAL PAIN: 0
CHEST TIGHTNESS: 0
NAUSEA: 0
DIARRHEA: 0

## 2021-07-14 NOTE — ED NOTES
Bed: 07  Expected date:   Expected time:   Means of arrival:   Comments:  Arzella Carrel, RN  07/14/21 Sarah Pacheco

## 2021-07-14 NOTE — ED PROVIDER NOTES
[address] [facility]        Pt Name: Jenni Velasquez  MRN: [id number]  [place] 1965  Date of evaluation: 7/14/2021  Provider: Matteo Rodrigues PA-C  PCP: Rodrigo Mcbride MD  Note Started: 7:44 PM EDT        I have seen and evaluated this patient with my supervising physician No att. providers found. CHIEF COMPLAINT       Chief Complaint   Patient presents with    Abnormal Chest X-ray     pt sent by doctor d/t abnormal chest x ray that was taken today. results showed a mass on one of the lungs. c/o SOB        HISTORY OF PRESENT ILLNESS   (Location, Timing/Onset, Context/Setting, Quality, Duration, Modifying Factors, Severity, Associated Signs and Symptoms)  Note limiting factors. Chief Complaint: Abnormal chest x-ray    Jenni Velasquez is a 60 y.o. male who presents to the emergency department today for evaluation for an abnormal chest x-ray. The patient states that for the past month he has had a cough, and shortness of breath. He states that his cough is dry, there is no sputum production or hemoptysis the patient that he has been trying to get into his primary care physician, but was unsuccessful until today. The patient states that his cough seems to be increasing in frequency, and he states that he is now reporting several episodes of posttussive emesis. The patient denies any spontaneous episodes of emesis. The patient states that he does not have any chest pain, states that he will notice some exertional shortness of breath. The patient states that this seems to be gradually getting worse with over the past month. 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 He does not smoke. The patient does not have any fever or chills. He has no abdominal pain, nausea, vomiting or diarrhea.   He has no urinary symptoms patient denies any IV drug use. The patient otherwise has no complaints at this time. Chest x-ray was performed today and he was sent to the emergency room for further care evaluation    Nursing Notes were all reviewed and agreed with or any disagreements were addressed in the HPI. REVIEW OF SYSTEMS    (2-9 systems for level 4, 10 or more for level 5)     Review of Systems   Constitutional: Negative for activity change, appetite change, chills and fever. HENT: Negative for congestion and rhinorrhea. Respiratory: Positive for cough and shortness of breath. Negative for chest tightness. Cardiovascular: Negative for chest pain. Gastrointestinal: Negative for abdominal pain, diarrhea, nausea and vomiting. Genitourinary: Negative for difficulty urinating, dysuria and hematuria. Positives and Pertinent negatives as per HPI. Except as noted above in the ROS, all other systems were reviewed and negative. PAST MEDICAL HISTORY     Past Medical History:   Diagnosis Date    HTN (hypertension)          SURGICAL HISTORY     Past Surgical History:   Procedure Laterality Date    EXCISION OF FACIAL MASS Right 03/24/2017    right cheek    KNEE ARTHROSCOPY Right 1993         CURRENTMEDICATIONS       Discharge Medication List as of 7/14/2021  9:25 PM      CONTINUE these medications which have NOT CHANGED    Details   lisinopril-hydroCHLOROthiazide (PRINZIDE;ZESTORETIC) 20-25 MG per tablet One daily, Disp-90 tablet, R-3**Patient requests 90 days supply**Normal      azithromycin (ZITHROMAX) 250 MG tablet Take 1 tablet by mouth See Admin Instructions for 5 days 500mg on day 1 followed by 250mg on days 2 - 5, Disp-6 tablet, R-0Normal      albuterol sulfate HFA (VENTOLIN HFA) 108 (90 Base) MCG/ACT inhaler Inhale 2 puffs into the lungs 4 times daily as needed for Wheezing, Disp-1 Inhaler, R-0Normal               ALLERGIES     Patient has no known allergies.     FAMILYHISTORY       Family History   Problem Relation Age of Onset    High Blood Pressure Mother     Cancer Father     Diabetes Father     High Blood Pressure Father     High Cholesterol Father     Heart Disease Father           SOCIAL HISTORY       Social History     Tobacco Use    Smoking status: Never Smoker    Smokeless tobacco: Current User     Types: Chew   Substance Use Topics    Alcohol use: Yes     Alcohol/week: 12.0 standard drinks     Types: 12 Cans of beer per week    Drug use: No       SCREENINGS             PHYSICAL EXAM    (up to 7 for level 4, 8 or more for level 5)     ED Triage Vitals [07/14/21 1835]   BP Temp Temp Source Pulse Resp SpO2 Height Weight   (!) 142/82 99.3 °F (37.4 °C) Oral 94 22 97 % 6' 2\" (1.88 m) 246 lb 9.6 oz (111.9 kg)       Physical Exam  Vitals and nursing note reviewed. Constitutional:       Appearance: He is well-developed. He is not diaphoretic. HENT:      Head: Normocephalic and atraumatic. Right Ear: External ear normal.      Left Ear: External ear normal.      Nose: Nose normal.   Eyes:      General:         Right eye: No discharge. Left eye: No discharge. Neck:      Trachea: No tracheal deviation. Cardiovascular:      Rate and Rhythm: Normal rate and regular rhythm. Heart sounds: No murmur heard. Pulmonary:      Effort: Pulmonary effort is normal. No respiratory distress. Breath sounds: No wheezing or rales. Comments: Scattered rhonchorous breath sounds to the lower lung field  Abdominal:      General: Bowel sounds are normal. There is no distension. Palpations: Abdomen is soft. Tenderness: There is no abdominal tenderness. There is no guarding. Musculoskeletal:         General: Normal range of motion. Cervical back: Normal range of motion and neck supple. Skin:     General: Skin is warm and dry. Neurological:      Mental Status: He is alert and oriented to person, place, and time.    Psychiatric:         Behavior: Behavior normal.         DIAGNOSTIC RESULTS LABS:    Labs Reviewed   CBC WITH AUTO DIFFERENTIAL - Abnormal; Notable for the following components:       Result Value    Hemoglobin 13.3 (*)     Hematocrit 40.1 (*)     Neutrophils Absolute 8.2 (*)     All other components within normal limits    Narrative:     Performed at:  [facility]  [address] [facility], [address]   Phone [phone]   COMPREHENSIVE METABOLIC PANEL - Abnormal; Notable for the following components:    CREATININE 0.6 (*)     Albumin 3.3 (*)     Albumin/Globulin Ratio 0.7 (*)     Alkaline Phosphatase 157 (*)     All other components within normal limits    Narrative:     Performed at:  [facility] [address], [facility], [address]   Phone [phone]   BRAIN NATRIURETIC PEPTIDE - Abnormal; Notable for the following components:    Pro- (*)     All other components within normal limits    Narrative:     Performed at:  [facility] [address], [facility], [address]   Phone [phone]   TROPONIN    Narrative:     Performed at:  [facility] [address], [facility], [address]   Phone [phone]       When ordered only abnormal lab results are displayed. All other labs were within normal range or not returned as of this dictation. EKG: When ordered, EKG's are interpreted by the Emergency Department Physician in the absence of a cardiologist.  Please see their note for interpretation of EKG. RADIOLOGY:   Non-plain film images such as CT, Ultrasound and MRI are read by the radiologist. Plain radiographic images are visualized and preliminarily interpreted by the ED Provider with the below findings:        Interpretation per the Radiologist below, if available at the time of this note:    CT CHEST PULMONARY EMBOLISM W CONTRAST   Final Result   1. No evidence of pulmonary embolic disease.    2. Large mass, likely pleural based within the major fissure on the left   measuring 10.0 x 10.1 x 15.7 cm. Differential considerations include a   pleural fibroma. However, the left hilar adenopathy is concerning for a   malignant etiology. Sampling is recommended. 3. Significant left lower lobe volume loss. Small left pleural effusion. XR CHEST (2 VW)    Result Date: 7/14/2021  EXAMINATION: TWO XRAY VIEWS OF THE CHEST 7/14/2021 5:20 pm COMPARISON: None. HISTORY: ORDERING SYSTEM PROVIDED HISTORY: Acute bronchitis due to infection TECHNOLOGIST PROVIDED HISTORY: Reason for exam:->Worsening cought x 6 weeks, SOB, fever today. Reason for Exam: Worsening cought x 6 weeks, SOB, fever today., Acute bronchitis due to infection Acuity: Unknown Type of Exam: Unknown FINDINGS: The cardiac silhouette is normal. Fullness is developed in the aortopulmonary window. There is a mass in the left mid lung measuring 18 x 11 cm. Large mass has developed in the left lung, associated with an AP window mediastinal component, possible adenopathy. Malignancy versus pleural fibroma. Given the large size, atypical pulmonary neoplasm such as germ cell neoplasm or sarcoma are possible. RECOMMENDATION: CT chest recommended. PROCEDURES   Unless otherwise noted below, none     Procedures    CRITICAL CARE TIME   N/A    CONSULTS:  IP CONSULT TO PULMONOLOGY      EMERGENCY DEPARTMENT COURSE and DIFFERENTIAL DIAGNOSIS/MDM:   Vitals:    Vitals:    07/14/21 2045 07/14/21 2100 07/14/21 2115 07/14/21 2130   BP:       Pulse: 100 86 88 87   Resp: 24 (!) 31 25 25   Temp:       TempSrc:       SpO2: 95% 95% 95% 96%   Weight:       Height:           Patient was given the following medications:  Medications   iopamidol (ISOVUE-370) 76 % injection 75 mL (75 mLs Intravenous Given 7/14/21 1953)           Briefly, this is a 57-year-old male who presents to the emergency department today for evaluation for an abnormal chest x-ray.   The patient states that he has had shortness of breath and cough and he states that this is actually been ongoing for over a month. The patient states that the cough is progressively worsening, and he states that he is now experiencing posttussive emesis. Patient states that he is also been noticing increasing exertional shortness of breath. Patient had a x-ray today as an outpatient was sent to the ED due to a large mass. Physical examination, the patient has scattered rhonchorous breath sounds, otherwise unremarkable     EKG seconded by my attending, please see his note for further detail    CBC shows no evidence of leukocytosis, mild anemia. CMP is unremarkable. Troponin negative. BNP is slightly elevated although normal for age. CT of chest does show a large 10 x 10 x 15 cm mass. Pulmonology has been paged. This marks in my shift, please see attending note for details and final disposition        FINAL IMPRESSION      1. Mass of left lung          DISPOSITION/PLAN   DISPOSITION Decision To Discharge 07/14/2021 09:19:03 PM      PATIENT REFERRED TO:  Radha Mclain MD   [address]  [phone]    Call in 1 day  Call her office tomorrow to confirm your appointment for this week. They will schedule outpatient testing for your lung mass.       DISCHARGE MEDICATIONS:  Discharge Medication List as of 7/14/2021  9:25 PM          DISCONTINUED MEDICATIONS:  Discharge Medication List as of 7/14/2021  9:25 PM                 (Please note that portions of this note were completed with a voice recognition program.  Efforts were made to edit the dictations but occasionally words are mis-transcribed.)    Bahman Mcdowell PA-C (electronically signed)            Bahman Mcdowell PA-C  07/15/21 3966

## 2021-07-14 NOTE — PROGRESS NOTES
2021  Stone Almeida (:  1965)    Allergies: No Known Allergies  (review in Epic)    FLU/RESPIRATORY/COVID-19 CLINIC EVALUATION    HPI:   Chief Complaint   Patient presents with    Cough      SYMPTOMS:    INSTRUCTIONS:  \"[x]\" Indicates a positive item  \"[]\" Indicates a negative item        Symptom duration, days:    Date symptoms started : _6 weeks prior    [] 1   [] 2   [] 3   [] 4 - 7   [] 8 - 10   [] 11 - 13   [] >14    [] Fevers    [] Symptom (not measured)  [] Measured (Result:  degrees)  [] Chills  [x] Cough [] Dry [] Productive   []Loss of Taste  [] Loss of Smell  [x]Decreased Appetite  [] Coughing up blood  }  [x] Chest Congestion  [] Nasal Congestion  [] Runny  Nose  [] Sneezing  [x] Feeling short of breath   []Sometimes    [x] Frequently- with coughing and exertion     [] All the time     [] Chest pain     [] Headaches  []Tolerable  [] Severe     [] Fatigue  [] Sore throat  [] Muscle aches  [x] Nausea  [x] Vomiting  []Unable to keep fluids down     [x] Diarrhea  [] Mild  []Severe       [x] Vaccinated for COVID 19 21  []   History of COVID 19 (Date:           )      [x] OTHER SYMPTOMS:Dizziness x2weeks. Worsened this week to the point of not working. Symptom course:   [x] Worsening     [] Stable     [] Improving      RISK FACTORS:1INSTRUCTIONS:  \"[x]\" Indicates a positive item. Negative  for risk factors if not checked.     [] Close contact with a lab confirmed COVID-19 patient within 14 days of symptom onset  [] History of travel from affected geographical areas within 14 days of symptom onset        PHYSICAL EXAMINATION:    Vitals:    21 1639   Pulse: 101   Temp: 100.5 °F (38.1 °C)   SpO2: 97%   Weight: 246 lb (111.6 kg)          [x] Alert  [x] Oriented to person/place/time    [x] No apparent distress   [] Toxic appearing  [] Face flushed appearing     [x] Normal Mood  [] Anxious appearing      [] Sclera clear    [] Pinna, TMs,  Canals normal bilaterally  [] TM Red  [] Right [] Left [] Bilateral  [] TM Bulging [] Right [] Left []  Billateral    [] Oropharynx [] Clear [] Red [] Exudate [] Swollen    [] No adenopathy [] Adenopathy __________    [] Lungs clear with good movement and effort  [x] Breathing appears normal     [x] Speaks in complete sentences  [] Appears tachypneic   [] Wheezing           [] Rhonchi   [x] Decreased- LLL    [x] CV RRR  [x] No Murmur  [] Murmur  [] Irregular  [] Tachycardic    [] OTHER:  1}      TESTS ORDERED:    [] POCT FLU  [] POCT STREP  [x] COVID-19 Test sent  [] Appointment made at testing clinic for patient to get a COVID test.       TEST RESULTS:    POCT FLU test:  [] Positive  [] Negative  POCT STREP test:  [] Positive  [] Negative    ASSESSMENT:  [] Allergic Rhinitis  [] Asthma Exacerbation  [] Bronchitis  [] COPD Exacerbation  [] Gastroenteritis  [] Influenza  [] Sinusitis  [] Strep Throat [] Sore Throat  [] Viral URI   [x] Possible COVID-19   [] Exposure to COVID -19  [] Positive for COVID  [] Screening for Viral Disease (COVID test no sx)        Alberto was seen today for cough. Diagnoses and all orders for this visit:    Acute bronchitis due to infection  Radiology called with STAT call back - large mass infiltrating lung - recommended further evaluation by the ED. Made contact with the patient he was already checking in through Triage. Called report to [facility].  -     azithromycin (ZITHROMAX) 250 MG tablet; Take 1 tablet by mouth See Admin Instructions for 5 days 500mg on day 1 followed by 250mg on days 2 - 5 (Patient not taking: Reported on 7/15/2021)  -     albuterol sulfate HFA (VENTOLIN HFA) 108 (90 Base) MCG/ACT inhaler; Inhale 2 puffs into the lungs 4 times daily as needed for Wheezing (Patient not taking: Reported on 7/15/2021)  -     XR CHEST STANDARD (2 VW);  Future    Suspected COVID-19 virus infection  -     Cancel: COVID-19    Other orders  -     Cancel: COVID-19  -     Cancel: COVID-19  -     COVID-19              [] Low risk for complications from COVID 19  [x] Moderate risk for complications from COVID 19  [] High risk for complications from COVID 19    PLAN:    [x] Discharge home with written instructions for:  [] Flu management  [] Strep throat management  [] Viral respiratory illness management  [] Sinusitis management  [x] Bronchitis Management  [x] Possible COVID-19 infection with self-quarantine and management of symptoms  [] Follow-up with primary care physician or emergency department if worsens  [] Note given for work    [] Referred to emergency department for evaluation    I, Dat Jade LPN, am scribing for and in the presence of LILIA Givens CNP.  Electronically signed by Dat Jade LPN on 5/43/13 at 1:60 PM EDT

## 2021-07-15 ENCOUNTER — OFFICE VISIT (OUTPATIENT)
Dept: PULMONOLOGY | Age: 56
End: 2021-07-15
Payer: COMMERCIAL

## 2021-07-15 VITALS
WEIGHT: 248 LBS | DIASTOLIC BLOOD PRESSURE: 80 MMHG | OXYGEN SATURATION: 97 % | HEIGHT: 74 IN | SYSTOLIC BLOOD PRESSURE: 130 MMHG | BODY MASS INDEX: 31.83 KG/M2 | HEART RATE: 91 BPM

## 2021-07-15 DIAGNOSIS — R91.8 MASS OF LEFT LUNG: Primary | ICD-10-CM

## 2021-07-15 DIAGNOSIS — E66.09 CLASS 1 OBESITY DUE TO EXCESS CALORIES WITHOUT SERIOUS COMORBIDITY WITH BODY MASS INDEX (BMI) OF 31.0 TO 31.9 IN ADULT: ICD-10-CM

## 2021-07-15 LAB
EKG ATRIAL RATE: 90 BPM
EKG DIAGNOSIS: NORMAL
EKG P AXIS: 39 DEGREES
EKG P-R INTERVAL: 134 MS
EKG Q-T INTERVAL: 364 MS
EKG QRS DURATION: 136 MS
EKG QTC CALCULATION (BAZETT): 445 MS
EKG R AXIS: -75 DEGREES
EKG T AXIS: 37 DEGREES
EKG VENTRICULAR RATE: 90 BPM
SARS-COV-2, PCR: NOT DETECTED

## 2021-07-15 PROCEDURE — 99204 OFFICE O/P NEW MOD 45 MIN: CPT | Performed by: INTERNAL MEDICINE

## 2021-07-15 PROCEDURE — 93010 ELECTROCARDIOGRAM REPORT: CPT | Performed by: INTERNAL MEDICINE

## 2021-07-15 NOTE — ED PROVIDER NOTES
This patient was seen by the Mid-Level Provider. I have seen and examined the patient, agree with the workup, evaluation, management and diagnosis. Care plan has been discussed. My assessment reveals a 49-year-old male who presents with a left lung mass. This is a 49-year-old male who arrived to the hospital today for an outpatient chest x-ray. During the chest x-ray the patient was noted to have a large left-sided lung mass. The patient was sent to the emergency department for further work-up. The patient has been feeling bad since June with a chronic cough. Radiology results:    CT CHEST PULMONARY EMBOLISM W CONTRAST   Preliminary Result   1. No evidence of pulmonary embolic disease. 2. Large mass, likely pleural based within the major fissure on the left   measuring 10.0 x 10.1 x 15.7 cm. Differential considerations include a   pleural fibroma. However, the left hilar adenopathy is concerning for a   malignant etiology. Sampling is recommended. 3. Significant left lower lobe volume loss. Small left pleural effusion.                LABS:    Labs Reviewed   CBC WITH AUTO DIFFERENTIAL - Abnormal; Notable for the following components:       Result Value    Hemoglobin 13.3 (*)     Hematocrit 40.1 (*)     Neutrophils Absolute 8.2 (*)     All other components within normal limits    Narrative:     Performed at:  [facility] [address], [place], [address]   Phone [phone]   COMPREHENSIVE METABOLIC PANEL - Abnormal; Notable for the following components:    CREATININE 0.6 (*)     Albumin 3.3 (*)     Albumin/Globulin Ratio 0.7 (*)     Alkaline Phosphatase 157 (*)     All other components within normal limits    Narrative:     Performed at:  [facility] [address], [place], [address] [place] [address]   Phone [phone]   BRAIN NATRIURETIC PEPTIDE - Abnormal; Notable for the following components:    Pro- (*)     All other components within normal limits    Narrative:     Performed at:  [facility]  [address] [facility], [address]   Phone [phone]   TROPONIN    Narrative:     Performed at:  [facility]  [address] [facility], [address]   Phone [phone]           EKG:    Sinus rhythm at a rate of 90 beats a minute with a right bundle-branch block with no acute ST elevations or depressions or pathologic Q waves. Exam:    Well-nourished male in no acute distress. He was neurologic intact with no focal motor or sensory deficits. Medical decision making:    The patient has remained stable throughout his hospital course. I spoke to Dr. Geovanni Cuba, pulmonologist, who was very helpful and his instructed me to have the patient follow-up with her office. In addition, I told the patient to call her office tomorrow to confirm the appointment. They will do further testing. The patient feels okay to go home. He is not hypoxic nor tachypneic. He is not toxic appearing. The patient was discharged in stable condition. The patient is to return for other problems or worsening of symptoms. FINAL IMPRESSION:    1.  Mass of left lung            Geovani Trevino MD  07/14/21 5254

## 2021-07-15 NOTE — PROGRESS NOTES
PULMONARY OFFICE NEW PATIENT VISIT    CONSULTING PHYSICIAN:  Isaiah Lucero MD     REASON FOR VISIT:   Chief Complaint   Patient presents with    New Patient     lung mass       DATE OF VISIT: 7/15/2021    HISTORY OF PRESENT ILLNESS: 60y.o. year old male comes into the pulmonary office for the first time. Patient reports that for the last 1 to 2 months he has been having persistent happening every day. Cough is dry nonproductive. He denies any hemoptysis, fevers, night sweats. Does have shortness of breath associated with wheezing which is intermittent. Denies any chest pain, chest tightness. Does have both anorexia as well as weight loss. He has lost about 30 pounds since January of this year. Lifelong non-smoker and denies any exposure to secondhand smoking. No exposure to secondhand smoking, asbestos, radiation, chemicals. Does have a strong family history of prostate cancers. REVIEW OF SYSTEMS:   CONSTITUTIONAL SYMPTOMS: The patient denies fever, fatigue, night sweats, weight loss or weight gain. HEENT: No vision changes. No tinnitus, Denies sinus pain. No hoarseness, or dysphagia. NECK: Patient denies swelling in the neck. CARDIOVASCULAR: Denies chest pain, palpitation, syncope. RESPIRATORY: Denies shortness of breath or cough. GASTROINTESTINAL: Denies nausea, abdominal pain or change in bowel function. GENITOURINARY: Denies obstructive symptoms. No history of incontinence. BREASTS: No masses or lumps in the breasts. SKIN: No rashes or itching. MUSCULOSKELETAL: Denies weakness or bone pain. NEUROLOGICAL: No headaches or seizures. PSYCHIATRIC: Denies mood swings or depression. ENDOCRINE: Denies heat or cold intolerance or excessive thirst.  HEMATOLOGIC/LYMPHATIC: Denies easy bruising or lymph node swelling. ALLERGIC/IMMUNOLOGIC: No environmental allergies.     PAST MEDICAL HISTORY:   Past Medical History:   Diagnosis Date    HTN (hypertension)        PAST SURGICAL HISTORY:   Past Surgical History:   Procedure Laterality Date    EXCISION OF FACIAL MASS Right 03/24/2017    right cheek    KNEE ARTHROSCOPY Right 1993       SOCIAL HISTORY:   Social History     Tobacco Use    Smoking status: Never Smoker    Smokeless tobacco: Current User     Types: Chew   Substance Use Topics    Alcohol use: Yes     Alcohol/week: 12.0 standard drinks     Types: 12 Cans of beer per week    Drug use: No       FAMILY HISTORY:   Family History   Problem Relation Age of Onset    High Blood Pressure Mother     Cancer Father     Diabetes Father     High Blood Pressure Father     High Cholesterol Father     Heart Disease Father        MEDICATIONS:     Current Outpatient Medications on File Prior to Visit   Medication Sig Dispense Refill    azithromycin (ZITHROMAX) 250 MG tablet Take 1 tablet by mouth See Admin Instructions for 5 days 500mg on day 1 followed by 250mg on days 2 - 5 (Patient not taking: Reported on 7/15/2021) 6 tablet 0    albuterol sulfate HFA (VENTOLIN HFA) 108 (90 Base) MCG/ACT inhaler Inhale 2 puffs into the lungs 4 times daily as needed for Wheezing (Patient not taking: Reported on 7/15/2021) 1 Inhaler 0    lisinopril-hydroCHLOROthiazide (PRINZIDE;ZESTORETIC) 20-25 MG per tablet One daily (Patient not taking: Reported on 7/15/2021) 90 tablet 3     No current facility-administered medications on file prior to visit. ALLERGIES:   Allergies as of 07/15/2021    (No Known Allergies)      OBJECTIVE:   height is 6' 2\" (1.88 m) and weight is 248 lb (112.5 kg). His blood pressure is 130/80 and his pulse is 91. His oxygen saturation is 97%. PHYSICAL EXAM:    CONSTITUTIONAL: He is a 60y.o.-year-old who appears his stated age. He is alert and oriented x 3 and in no acute distress. HEENT: PERRLA, EOMI. No scleral icterus. No thrush, atraumatic, normocephalic. NECK: Supple, without cervical or supraclavicular lymphadenopathy:  CARDIOVASCULAR: S1 S2 RRR. Without murmer  RESPIRATORY & CHEST: Lungs are clear to auscultation and percussion. No wheezing, no crackles. Good air movement  GASTROINTESTINAL & ABDOMEN: Soft, nontender, positive bowel sounds in all quadrants, non-distended, without hepatosplenomegaly. GENITOURINARY: Deferred. MUSCULOSKELETAL: No tenderness to palpation of the axial skeleton. There is no clubbing. No cyanosis. No edema of the lower extremities. SKIN OF BODY: No rash or jaundice. PSYCHIATRIC EVALUATION: Normal affect. Patient answers questions appropriately. HEMATOLOGIC/LYMPHATIC/ IMMUNOLOGIC: No palpable lymphadenopathy. NEUROLOGIC: Alert and oriented x 3. Groslly non-focal. Motor strength is 5+/5 in all muscle groups. The patient has a normal sensorium globally. LABS:    Lab Summary Latest Ref Rng & Units 7/14/2021 10/8/2020   WBC 4.0 - 11.0 K/uL 10.6 5.5   Hgb 13.5 - 17.5 g/dL 13. 3(L) 15.7   Hct 40.5 - 52.5 % 40. 1(L) 45.3   Platelets 544 - 345 K/uL 426 224   Sodium 136 - 145 mmol/L 137 140   Potassium 3.5 - 5.1 mmol/L 4.4 4.8   BUN 7 - 20 mg/dL 12 11   Creatinine 0.9 - 1.3 mg/dL 0.6(L) 0.8(L)   Glucose 70 - 99 mg/dL 96 -   Calcium 8.3 - 10.6 mg/dL 9.4 9.8   Alk Phos 40 - 129 U/L 157(H) 92   Albumin 3.4 - 5.0 g/dL 3. 3(L) 4.5   Bilirubin 0.0 - 1.0 mg/dL 0.5 0.7   AST 15 - 37 U/L 21 34   ALT 10 - 40 U/L 31 41(H)   HDL cholesterol 40 - 60 mg/dL - 40   LDL calc <100 mg/dL - 127(H)   VLDL calc Not Established mg/dL - 16   TSH 0.27 - 4.20 uIU/mL - 1.34   Some recent data might be hidden       All labs were personally reviewed by me any my interpretation is: CBC is mild anemia. Chem 8 is hypoalbuminemia.      IMAGING:    Narrative   EXAMINATION:   CTA OF THE CHEST 7/14/2021 7:53 pm           FINDINGS:   Pulmonary Arteries: Pulmonary arteries are adequately opacified for   evaluation.  No evidence of intraluminal filling defect to suggest pulmonary   embolism.  Main pulmonary artery is normal in caliber.       Mediastinum: The thoracic aorta is normal in course and caliber with   scattered atherosclerotic plaque.  The heart is not enlarged with no   pericardial effusion.  Scattered coronary vascular calcification.  Left hilar   adenopathy is present with nodes measuring 2.1 x 3.4 cm and 2.1 x 2.9 cm.  No   other significant mediastinal or right hilar adenopathy.       Lungs/pleura: Large mass within the mid left lung appears to arise from the   pleura at the level of the major fissure.  The mass measures 10.0 x 10.1 x   15.7 cm in size.  The mass is of fairly uniform decreased attenuation with   extensive areas of calcification.  There are atelectatic changes within the   left lung primarily involving basal segments of the left lower lobe and   superior segment of the left lower lobe.  A small left pleural effusion is   present.  The lungs otherwise are clear.       Upper Abdomen: Limited images of the upper abdomen are unremarkable.       Soft Tissues/Bones: No acute bone or soft tissue abnormality.           Impression   1. No evidence of pulmonary embolic disease. 2. Large mass, likely pleural based within the major fissure on the left   measuring 10.0 x 10.1 x 15.7 cm.  Differential considerations include a   pleural fibroma.  However, the left hilar adenopathy is concerning for a   malignant etiology.  Sampling is recommended.    3. Significant left lower lobe volume loss.  Small left pleural effusion.                 Pulmonary Functions Testing Results:          IMPRESSION AND RECOMMENDATIONS:     1. Mass of left lung  -Patient has left lung mass with extensive calcification.    -While calcification and non-smoker status can dictate a benign etiology, sheer size of the mass, normal chest x-ray in 2014, associated hilar lymphadenopathy, weight loss and persistent cough suggests a malignant etiology.  -I discussed the case with the interventional radiologist and this mass is amenable to CT-guided needle biopsy.  -The para-aortic lymph node will be unreachable with EBUS. -If the pathology shows this to be malignant lesion, patient will also need a PET CT scan in order to assess the staging of the disease.  - IR CT GUIDED BIOPSY AIRIRATE; Future    2. Class 1 obesity due to excess calories without serious comorbidity with body mass index (BMI) of 31.0 to 31.9 in adult  -I strongly advised the patient to make efforts to lose weight. I discussed various modalities including moderate intensity intermittent exercises, diet control and bariatric surgery. If the patient loses even 10 to 15% of current body weight, it will be beneficial in improving the overall health. Return in about 2 weeks (around 7/29/2021). Sintia Ashton MD  Pulmonary Critical Care and Sleep Medicine  7/15/2021, 4:28 PM    This note was completed using dragon medical speech recognition software. Grammatical errors, random word insertions, pronoun errors and incomplete sentences are occasional consequences of this technology due to software limitations. If there are questions or concerns about the content of this note of information contained within the body of this dictation they should be addressed with the provider for clarification.

## 2021-07-15 NOTE — ED NOTES
Pt discharged home. Follow up instructed with Pulm. Pt plans to call tomorrow. No new medications. Pt understands that if symptoms worsen ED is available.       Valerie Castillo RN  07/14/21 9201

## 2021-07-16 ENCOUNTER — PRE-PROCEDURE TELEPHONE (OUTPATIENT)
Dept: INTERVENTIONAL RADIOLOGY/VASCULAR | Age: 56
End: 2021-07-16

## 2021-07-20 ENCOUNTER — HOSPITAL ENCOUNTER (OUTPATIENT)
Dept: CT IMAGING | Age: 56
Discharge: HOME | End: 2021-07-20
Payer: COMMERCIAL

## 2021-07-20 ENCOUNTER — HOSPITAL ENCOUNTER (OUTPATIENT)
Dept: GENERAL RADIOLOGY | Age: 56
Discharge: HOME | End: 2021-07-20
Payer: COMMERCIAL

## 2021-07-20 VITALS
DIASTOLIC BLOOD PRESSURE: 68 MMHG | BODY MASS INDEX: 30.71 KG/M2 | RESPIRATION RATE: 20 BRPM | WEIGHT: 247 LBS | HEIGHT: 75 IN | OXYGEN SATURATION: 100 % | HEART RATE: 75 BPM | SYSTOLIC BLOOD PRESSURE: 118 MMHG | TEMPERATURE: 97 F

## 2021-07-20 DIAGNOSIS — R91.8 MASS OF LEFT LUNG: ICD-10-CM

## 2021-07-20 LAB
ANION GAP SERPL CALCULATED.3IONS-SCNC: 3 MMOL/L (ref 3–16)
APTT: 36.1 SEC (ref 26.2–38.6)
BASOPHILS ABSOLUTE: 0.1 K/UL (ref 0–0.2)
BASOPHILS RELATIVE PERCENT: 0.6 %
BUN BLDV-MCNC: 8 MG/DL (ref 7–20)
CALCIUM SERPL-MCNC: 9.6 MG/DL (ref 8.3–10.6)
CHLORIDE BLD-SCNC: 103 MMOL/L (ref 99–110)
CO2: 28 MMOL/L (ref 21–32)
CREAT SERPL-MCNC: 0.8 MG/DL (ref 0.9–1.3)
EOSINOPHILS ABSOLUTE: 0.1 K/UL (ref 0–0.6)
EOSINOPHILS RELATIVE PERCENT: 1.1 %
GFR AFRICAN AMERICAN: >60
GFR NON-AFRICAN AMERICAN: >60
GLUCOSE BLD-MCNC: 112 MG/DL (ref 70–99)
HCT VFR BLD CALC: 39 % (ref 40.5–52.5)
HEMOGLOBIN: 13 G/DL (ref 13.5–17.5)
INR BLD: 1.33 (ref 0.88–1.12)
LYMPHOCYTES ABSOLUTE: 0.8 K/UL (ref 1–5.1)
LYMPHOCYTES RELATIVE PERCENT: 9.8 %
MCH RBC QN AUTO: 28.3 PG (ref 26–34)
MCHC RBC AUTO-ENTMCNC: 33.4 G/DL (ref 31–36)
MCV RBC AUTO: 84.6 FL (ref 80–100)
MONOCYTES ABSOLUTE: 0.7 K/UL (ref 0–1.3)
MONOCYTES RELATIVE PERCENT: 9 %
NEUTROPHILS ABSOLUTE: 6.5 K/UL (ref 1.7–7.7)
NEUTROPHILS RELATIVE PERCENT: 79.5 %
PDW BLD-RTO: 13.4 % (ref 12.4–15.4)
PLATELET # BLD: 530 K/UL (ref 135–450)
PMV BLD AUTO: 7.2 FL (ref 5–10.5)
POTASSIUM SERPL-SCNC: 4.8 MMOL/L (ref 3.5–5.1)
PROTHROMBIN TIME: 15.2 SEC (ref 9.9–12.7)
RBC # BLD: 4.61 M/UL (ref 4.2–5.9)
SODIUM BLD-SCNC: 134 MMOL/L (ref 136–145)
WBC # BLD: 8.2 K/UL (ref 4–11)

## 2021-07-20 PROCEDURE — 85730 THROMBOPLASTIN TIME PARTIAL: CPT

## 2021-07-20 PROCEDURE — 7100000010 HC PHASE II RECOVERY - FIRST 15 MIN

## 2021-07-20 PROCEDURE — 80048 BASIC METABOLIC PNL TOTAL CA: CPT

## 2021-07-20 PROCEDURE — 32408 CORE NDL BX LNG/MED PERQ: CPT

## 2021-07-20 PROCEDURE — 77012 CT SCAN FOR NEEDLE BIOPSY: CPT

## 2021-07-20 PROCEDURE — 71045 X-RAY EXAM CHEST 1 VIEW: CPT

## 2021-07-20 PROCEDURE — 36415 COLL VENOUS BLD VENIPUNCTURE: CPT

## 2021-07-20 PROCEDURE — 6360000002 HC RX W HCPCS: Performed by: RADIOLOGY

## 2021-07-20 PROCEDURE — 88342 IMHCHEM/IMCYTCHM 1ST ANTB: CPT

## 2021-07-20 PROCEDURE — 85610 PROTHROMBIN TIME: CPT

## 2021-07-20 PROCEDURE — 7100000011 HC PHASE II RECOVERY - ADDTL 15 MIN

## 2021-07-20 PROCEDURE — 88341 IMHCHEM/IMCYTCHM EA ADD ANTB: CPT

## 2021-07-20 PROCEDURE — 85025 COMPLETE CBC W/AUTO DIFF WBC: CPT

## 2021-07-20 PROCEDURE — 88305 TISSUE EXAM BY PATHOLOGIST: CPT

## 2021-07-20 RX ORDER — FENTANYL CITRATE 50 UG/ML
INJECTION, SOLUTION INTRAMUSCULAR; INTRAVENOUS
Status: COMPLETED | OUTPATIENT
Start: 2021-07-20 | End: 2021-07-20

## 2021-07-20 RX ORDER — ACETAMINOPHEN 325 MG/1
650 TABLET ORAL EVERY 4 HOURS PRN
Status: DISCONTINUED | OUTPATIENT
Start: 2021-07-20 | End: 2021-07-21 | Stop reason: HOSPADM

## 2021-07-20 RX ORDER — MIDAZOLAM HYDROCHLORIDE 1 MG/ML
INJECTION INTRAMUSCULAR; INTRAVENOUS
Status: COMPLETED | OUTPATIENT
Start: 2021-07-20 | End: 2021-07-20

## 2021-07-20 RX ADMIN — FENTANYL CITRATE 50 MCG: 50 INJECTION, SOLUTION INTRAMUSCULAR; INTRAVENOUS at 11:34

## 2021-07-20 RX ADMIN — MIDAZOLAM 1 MG: 1 INJECTION INTRAMUSCULAR; INTRAVENOUS at 11:34

## 2021-07-20 RX ADMIN — MIDAZOLAM 1 MG: 1 INJECTION INTRAMUSCULAR; INTRAVENOUS at 11:37

## 2021-07-20 RX ADMIN — FENTANYL CITRATE 50 MCG: 50 INJECTION, SOLUTION INTRAMUSCULAR; INTRAVENOUS at 11:37

## 2021-07-20 ASSESSMENT — PAIN SCALES - GENERAL
PAINLEVEL_OUTOF10: 0

## 2021-07-20 NOTE — PROGRESS NOTES
Discharge instructions reviewed and understanding verbalized per pt/family with copy given. All home medications/new prescriptions have been reviewed, questions answered and patient/family state understanding. Medication information sheet provided for new prescriptions received when applicable    Xray cleared.  Ok to d/c    Site cdi

## 2021-07-20 NOTE — PRE SEDATION
Sedation Pre-Procedure Note    Patient Name: Cris Collins   YOB: 1965  Room/Bed: Room/bed info not found  Medical Record Number: [id number]  Date: 7/20/2021   Time: 11:23 AM       Indication: Left lung mass    Consent: I have discussed with the patient and/or the patient representative the indication, alternatives, and the possible risks and/or complications of the planned procedure and the anesthesia methods. The patient and/or patient representative appear to understand and agree to proceed. Vital Signs:   Vitals:    07/20/21 1056   BP: 128/83   Pulse: 84   Resp: 16   Temp: 97.2 °F (36.2 °C)   SpO2: 97%       Past Medical History:   has a past medical history of HTN (hypertension). Past Surgical History:   has a past surgical history that includes Knee arthroscopy (Right, 1993) and Excision of Facial Mass (Right, 03/24/2017). Medications:   Scheduled Meds:   Continuous Infusions:   PRN Meds:   Home Meds:   Prior to Admission medications    Medication Sig Start Date End Date Taking? Authorizing Provider   albuterol sulfate HFA (VENTOLIN HFA) 108 (90 Base) MCG/ACT inhaler Inhale 2 puffs into the lungs 4 times daily as needed for Wheezing  Patient not taking: Reported on 7/15/2021 7/14/21   LILIA Thakur - BE   lisinopril-hydroCHLOROthiazide (PRINZIDE;ZESTORETIC) 20-25 MG per tablet One daily  Patient not taking: Reported on 7/15/2021 1/14/21   Cecille Isidro MD         Pre-Sedation Documentation and Exam:   I have reviewed the patient's history and review of systems.     Mallampati Airway Assessment:  Mallampati Class II - (soft palate, fauces & uvula are visible)    Prior History of Anesthesia Complications:   none    ASA Classification:  Class 2 - A normal healthy patient with mild systemic disease    Sedation/ Anesthesia Plan:   intravenous sedation    Medications Planned:   fentanyl intravenously and midazolam (Versed)  intravenously    Patient is an appropriate candidate for plan of sedation: yes    Electronically signed by Taye Chow MD on 7/20/2021 at 11:23 AM

## 2021-07-22 ENCOUNTER — TELEPHONE (OUTPATIENT)
Dept: PULMONOLOGY | Age: 56
End: 2021-07-22

## 2021-07-30 NOTE — TELEPHONE ENCOUNTER
His biopsy slides have been sent to [facility] for further review. As of now I do not have any new information other than what I discussed with him last week. I will inform him as soon as I hear from our pathology department. In the meanwhile I can refer him to oncology in order to start the process, and once the results are double checked they can discuss treatment options with him. Thanks.

## 2021-08-06 NOTE — TELEPHONE ENCOUNTER
Pathology Dept called and said the report should be signed and completed today by 1:00. They will fax report when complete.     Pt also called and wanted to know if doctor would complete short term disability paperwork

## 2021-08-06 NOTE — TELEPHONE ENCOUNTER
Pathologist has finalized the result and patient definitely has cancer. Now the diagnosis is confirmed, I will refer the patient to her medical oncologist for discussion of treatment options. I have seen the patient only 1 time. Patient should discuss with his family physician regarding finding of short-term disability papers. Thanks.

## 2021-08-06 NOTE — TELEPHONE ENCOUNTER
Called and appt made with Dr Melissa Sheffield on 8-12-21 3:15 arrival 3:30 appt [facility] with Dr Morena Cuevas and he has agreed to do Disability Paperwork until pt is seen by Dr Melissa Sheffield.

## 2021-08-17 ENCOUNTER — OFFICE VISIT (OUTPATIENT)
Dept: FAMILY MEDICINE CLINIC | Age: 56
End: 2021-08-17
Payer: COMMERCIAL

## 2021-08-17 VITALS
WEIGHT: 239 LBS | OXYGEN SATURATION: 98 % | BODY MASS INDEX: 29.87 KG/M2 | SYSTOLIC BLOOD PRESSURE: 118 MMHG | HEART RATE: 87 BPM | DIASTOLIC BLOOD PRESSURE: 80 MMHG

## 2021-08-17 DIAGNOSIS — C49.9 MYXOID LIPOSARCOMA (HCC): Primary | ICD-10-CM

## 2021-08-17 PROCEDURE — 99214 OFFICE O/P EST MOD 30 MIN: CPT | Performed by: FAMILY MEDICINE

## 2021-08-17 ASSESSMENT — ENCOUNTER SYMPTOMS
SHORTNESS OF BREATH: 1
DIARRHEA: 0
CONSTIPATION: 0
COUGH: 1

## 2021-08-17 NOTE — PROGRESS NOTES
SUBJECTIVE:    Francisco Martinez is a 60 y.o. male who presents for a follow up visit. Chief Complaint   Patient presents with    Follow-up     FMLA paper work        Cough  This is a new problem. Episode onset: First part of June. The problem has been waxing and waning. The cough is non-productive. Associated symptoms include chest pain (occ'lly with cough causes Left sided pain) and shortness of breath. Pertinent negatives include no fever or postnasal drip. The symptoms are aggravated by exercise and lying down. Patient was found to have lung cancer. He is just at the beginning of his work-up and treatment. He stated next step is a PET scan/CT scan. Patient's medications, allergies, past medical,surgical, social and family histories were reviewed and updated as appropriate. Past Medical History:   Diagnosis Date    HTN (hypertension)      Past Surgical History:   Procedure Laterality Date    CT NEEDLE BIOPSY LUNG PERCUTANEOUS  7/20/2021    CT NEEDLE BIOPSY LUNG PERCUTANEOUS 7/20/2021 F CT SCAN    EXCISION OF FACIAL MASS Right 03/24/2017    right cheek    KNEE ARTHROSCOPY Right 1993     Family History   Problem Relation Age of Onset    High Blood Pressure Mother     Cancer Father     Diabetes Father     High Blood Pressure Father     High Cholesterol Father     Heart Disease Father      Social History     Tobacco Use    Smoking status: Never Smoker    Smokeless tobacco: Current User     Types: Chew   Substance Use Topics    Alcohol use: Yes     Alcohol/week: 12.0 standard drinks     Types: 12 Cans of beer per week      No Known Allergies  No current outpatient medications on file prior to visit. No current facility-administered medications on file prior to visit. Review of Systems   Constitutional: Positive for fatigue. Negative for fever. HENT: Negative for congestion and postnasal drip. Respiratory: Positive for cough and shortness of breath.     Cardiovascular: Positive occasionally words are mis-transcribed.

## 2021-08-23 ENCOUNTER — TELEPHONE (OUTPATIENT)
Dept: FAMILY MEDICINE CLINIC | Age: 56
End: 2021-08-23

## 2021-08-23 ENCOUNTER — HOSPITAL ENCOUNTER (OUTPATIENT)
Dept: PET IMAGING | Age: 56
Discharge: HOME | End: 2021-08-23
Payer: COMMERCIAL

## 2021-08-23 VITALS — WEIGHT: 239 LBS | HEIGHT: 75 IN | BODY MASS INDEX: 29.72 KG/M2

## 2021-08-23 DIAGNOSIS — C49.3 MALIGNANT NEOPLASM OF SOFT TISSUE OF AXILLA (HCC): ICD-10-CM

## 2021-08-23 DIAGNOSIS — C49.3 SARCOMA OF CHEST WALL (HCC): ICD-10-CM

## 2021-08-23 PROCEDURE — A9552 F18 FDG: HCPCS | Performed by: INTERNAL MEDICINE

## 2021-08-23 PROCEDURE — 3430000000 HC RX DIAGNOSTIC RADIOPHARMACEUTICAL: Performed by: INTERNAL MEDICINE

## 2021-08-23 PROCEDURE — 78815 PET IMAGE W/CT SKULL-THIGH: CPT

## 2021-08-23 RX ORDER — FLUDEOXYGLUCOSE F 18 200 MCI/ML
15.41 INJECTION, SOLUTION INTRAVENOUS
Status: COMPLETED | OUTPATIENT
Start: 2021-08-23 | End: 2021-08-23

## 2021-08-23 RX ADMIN — FLUDEOXYGLUCOSE F 18 15.41 MILLICURIE: 200 INJECTION, SOLUTION INTRAVENOUS at 12:56

## 2021-08-23 NOTE — TELEPHONE ENCOUNTER
PT is calling about his [facility] paperwork that was completed and faxed back to his employer. The Employer is faxing the paperwork back needing more information and dates. . The employer needs forms faxed back by 09/03. Ari Thorpe

## 2021-08-27 ENCOUNTER — HOSPITAL ENCOUNTER (OUTPATIENT)
Dept: PULMONOLOGY | Age: 56
Discharge: HOME | End: 2021-08-27
Payer: COMMERCIAL

## 2021-08-27 VITALS — OXYGEN SATURATION: 99 % | HEART RATE: 82 BPM | RESPIRATION RATE: 18 BRPM

## 2021-08-27 PROCEDURE — 94726 PLETHYSMOGRAPHY LUNG VOLUMES: CPT

## 2021-08-27 PROCEDURE — 94060 EVALUATION OF WHEEZING: CPT

## 2021-08-27 PROCEDURE — 94760 N-INVAS EAR/PLS OXIMETRY 1: CPT

## 2021-08-27 PROCEDURE — 6370000000 HC RX 637 (ALT 250 FOR IP): Performed by: INTERNAL MEDICINE

## 2021-08-27 PROCEDURE — 94729 DIFFUSING CAPACITY: CPT

## 2021-08-27 PROCEDURE — 94200 LUNG FUNCTION TEST (MBC/MVV): CPT

## 2021-08-27 RX ORDER — ALBUTEROL SULFATE 90 UG/1
2 AEROSOL, METERED RESPIRATORY (INHALATION) ONCE
Status: COMPLETED | OUTPATIENT
Start: 2021-08-27 | End: 2021-08-27

## 2021-08-27 RX ADMIN — Medication 2 PUFF: at 13:15

## 2021-08-30 NOTE — PROCEDURES
Pulmonary Function Testing      Patient name:  Kishore Schultz     [address] [place] [address] Unit #:   [id number]   Date of test: 8/27/2021  Date of interpretation:   8/30/2021    Mr. Kishore Schultz is a 60y.o. year-old non smoker. The spirometry data were acceptable and reproducible. Spirometry:  Flow volume loops were obstructed. The FEV-1/FVC ratio was decreased. The  post-bronchodilator FEV-1 was 2.09 liters (48% of predicted), which was severely decreased. The FVC was 3.17 liters (55% of predicted), which was decreased. Response to inhaled bronchodilators (albuterol) was not significant. Lung volumes:  Lung volumes were tested by plethysmography. The total lung capacity was 6.47 liters (82% of predicted), which was normal. The residual volume was 2.74 liters (102% of predicted), which was normal. The ratio of residual volume to total lung capacity (RV/TLC) was 116%, which was normal. Specific airway resistance was normal.    Diffusion capacity was found to be increased. Interpretation:  Severe obstructive airway disease with an insignificant bronchodilator response.

## 2021-08-31 ENCOUNTER — TELEPHONE (OUTPATIENT)
Dept: PULMONOLOGY | Age: 56
End: 2021-08-31

## 2021-08-31 NOTE — TELEPHONE ENCOUNTER
Dr Mauri Shane office called and wants to know if we can fax the pt pft interpretation notes.   The pft was done on 8/27/21    Fax # [phone]

## 2021-09-02 ENCOUNTER — OFFICE VISIT (OUTPATIENT)
Dept: CARDIOTHORACIC SURGERY | Age: 56
End: 2021-09-02
Payer: COMMERCIAL

## 2021-09-02 VITALS
SYSTOLIC BLOOD PRESSURE: 132 MMHG | OXYGEN SATURATION: 97 % | RESPIRATION RATE: 14 BRPM | HEIGHT: 75 IN | WEIGHT: 236.8 LBS | BODY MASS INDEX: 29.44 KG/M2 | DIASTOLIC BLOOD PRESSURE: 78 MMHG | HEART RATE: 90 BPM

## 2021-09-02 DIAGNOSIS — C34.82 MALIGNANT NEOPLASM OF OVERLAPPING SITES OF LEFT LUNG (HCC): Primary | ICD-10-CM

## 2021-09-02 PROCEDURE — 99203 OFFICE O/P NEW LOW 30 MIN: CPT | Performed by: THORACIC SURGERY (CARDIOTHORACIC VASCULAR SURGERY)

## 2021-10-11 ENCOUNTER — HOSPITAL ENCOUNTER (OUTPATIENT)
Dept: NON INVASIVE DIAGNOSTICS | Age: 56
Discharge: HOME | End: 2021-10-11
Payer: COMMERCIAL

## 2021-10-11 LAB
LV EF: 58 %
LVEF MODALITY: NORMAL

## 2021-10-11 PROCEDURE — 93017 CV STRESS TEST TRACING ONLY: CPT | Performed by: INTERNAL MEDICINE

## 2021-10-11 PROCEDURE — A9502 TC99M TETROFOSMIN: HCPCS | Performed by: THORACIC SURGERY (CARDIOTHORACIC VASCULAR SURGERY)

## 2021-10-11 PROCEDURE — 3430000000 HC RX DIAGNOSTIC RADIOPHARMACEUTICAL: Performed by: THORACIC SURGERY (CARDIOTHORACIC VASCULAR SURGERY)

## 2021-10-11 PROCEDURE — 6360000002 HC RX W HCPCS: Performed by: INTERNAL MEDICINE

## 2021-10-11 PROCEDURE — 78452 HT MUSCLE IMAGE SPECT MULT: CPT | Performed by: INTERNAL MEDICINE

## 2021-10-11 RX ADMIN — TETROFOSMIN 10 MILLICURIE: 1.38 INJECTION, POWDER, LYOPHILIZED, FOR SOLUTION INTRAVENOUS at 08:43

## 2021-10-11 RX ADMIN — TETROFOSMIN 30 MILLICURIE: 1.38 INJECTION, POWDER, LYOPHILIZED, FOR SOLUTION INTRAVENOUS at 10:08

## 2021-10-11 RX ADMIN — REGADENOSON 0.4 MG: 0.08 INJECTION, SOLUTION INTRAVENOUS at 09:57

## 2021-10-11 NOTE — PROGRESS NOTES
Instructed on Lexiscan Stress Test Procedure including possible side effects/ adverse reactions. Patient verbalizes  understanding and denies having any questions. See [address] [place] [address] Rd Cardiology.   Fely Koch RN

## 2022-02-01 ENCOUNTER — TELEPHONE (OUTPATIENT)
Dept: FAMILY MEDICINE CLINIC | Age: 57
End: 2022-02-01

## 2022-02-01 DIAGNOSIS — L05.91 CYST NEAR COCCYX: Primary | ICD-10-CM

## 2022-02-09 ENCOUNTER — OFFICE VISIT (OUTPATIENT)
Dept: SURGERY | Age: 57
End: 2022-02-09
Payer: COMMERCIAL

## 2022-02-09 VITALS
BODY MASS INDEX: 26.73 KG/M2 | DIASTOLIC BLOOD PRESSURE: 70 MMHG | HEIGHT: 75 IN | WEIGHT: 215 LBS | SYSTOLIC BLOOD PRESSURE: 118 MMHG

## 2022-02-09 DIAGNOSIS — L72.3 SEBACEOUS CYST: ICD-10-CM

## 2022-02-09 PROCEDURE — 10061 I&D ABSCESS COMP/MULTIPLE: CPT | Performed by: SURGERY

## 2022-02-09 RX ORDER — AMOXICILLIN AND CLAVULANATE POTASSIUM 875; 125 MG/1; MG/1
1 TABLET, FILM COATED ORAL 2 TIMES DAILY
Qty: 20 TABLET | Refills: 0 | Status: SHIPPED | OUTPATIENT
Start: 2022-02-09 | End: 2022-02-19

## 2022-02-09 ASSESSMENT — ENCOUNTER SYMPTOMS
BACK PAIN: 0
EYE ITCHING: 0
APNEA: 0
ABDOMINAL PAIN: 0
ABDOMINAL DISTENTION: 0
CHEST TIGHTNESS: 0
EYE DISCHARGE: 0
COLOR CHANGE: 0

## 2022-02-09 NOTE — PROGRESS NOTES
[facility]  SUBJECTIVE:    Chief Complaint: Sebaceous cyst    Yazmin Parks   1965   60 y.o. male presents with a sebaceous cyst on his upper back that has been present for many months. He has lung cancer and underwent radiation at the end of 2021 which inflamed the cyst.  It has grown and become more tender refractory to antibiotics over the last week. He is to have lung surgery within the month and presents for treatment of the infected sebaceous cyst    Review of Systems   Constitutional: Negative for activity change and appetite change. HENT: Negative for congestion and dental problem. Eyes: Negative for discharge and itching. Respiratory: Negative for apnea and chest tightness. Cardiovascular: Negative for chest pain and leg swelling. Gastrointestinal: Negative for abdominal distention and abdominal pain. Endocrine: Negative for cold intolerance and heat intolerance. Genitourinary: Negative for difficulty urinating and dysuria. Musculoskeletal: Negative for arthralgias and back pain. Skin: Positive for wound. Negative for color change. Allergic/Immunologic: Negative for environmental allergies and food allergies. Neurological: Negative for dizziness and facial asymmetry. Hematological: Negative for adenopathy. Does not bruise/bleed easily. Psychiatric/Behavioral: Negative for agitation and behavioral problems.        Past Medical History:   Diagnosis Date    HTN (hypertension)      Past Surgical History:   Procedure Laterality Date    CT NEEDLE BIOPSY LUNG PERCUTANEOUS  7/20/2021    CT NEEDLE BIOPSY LUNG PERCUTANEOUS 7/20/2021 [facility] CT SCAN    EXCISION OF FACIAL MASS Right 03/24/2017    right cheek    KNEE ARTHROSCOPY Right 1993     Social History     Socioeconomic History    Marital status:      Spouse name: Not on file    Number of children: Not on file    Years of education: Not on file    Highest education level: Not on file   Occupational History    Not on file   Tobacco Use    Smoking status: Never Smoker    Smokeless tobacco: Current User     Types: Chew   Substance and Sexual Activity    Alcohol use: Yes     Alcohol/week: 12.0 standard drinks     Types: 12 Cans of beer per week    Drug use: No    Sexual activity: Not on file   Other Topics Concern    Not on file   Social History Narrative    Not on file     Social Determinants of Health     Financial Resource Strain:     Difficulty of Paying Living Expenses: Not on file   Food Insecurity:     Worried About Running Out of Food in the Last Year: Not on file    [place] of Food in the Last Year: Not on file   Transportation Needs:     Lack of Transportation (Medical): Not on file    Lack of Transportation (Non-Medical):  Not on file   Physical Activity:     Days of Exercise per Week: Not on file    Minutes of Exercise per Session: Not on file   Stress:     Feeling of Stress : Not on file   Social Connections:     Frequency of Communication with Friends and Family: Not on file    Frequency of Social Gatherings with Friends and Family: Not on file    Attends Quaker Services: Not on file    Active Member of [address] [place] [address] or Organizations: Not on file    Attends Club or Organization Meetings: Not on file    Marital Status: Not on file   Intimate Partner Violence:     Fear of Current or Ex-Partner: Not on file    Emotionally Abused: Not on file    Physically Abused: Not on file    Sexually Abused: Not on file   Housing Stability:     Unable to Pay for Housing in the Last Year: Not on file    Number of [place] in the Last Year: Not on file    Unstable Housing in the Last Year: Not on file      Family History   Problem Relation Age of Onset    High Blood Pressure Mother     Cancer Father     Diabetes Father     High Blood Pressure Father     High Cholesterol Father     Heart Disease Father      Current Outpatient Medications   Medication Sig in 1 week for a wound check    Mikie Reyes MD, FACS  2/9/2022  1:02 PM

## 2022-02-09 NOTE — Clinical Note
Thank you for sending Mr. Juarez Null. I&D'd upper back sebaceous cyst and released a large amount of purulence.  Will see back in 1 week for a wound check and try and get this healed before his lung surgery later this month hopefully

## 2022-02-09 NOTE — PATIENT INSTRUCTIONS
Local wound care with dry dressing as needed daily starting tomorrow  May shower normally starting tomorrow  Primary pain control with tylenol and ibuprofen with food  Will start on antibiotics.  I will send a wound culture and adjust pending the sensitivities as needed   Return to office in 1 week for a wound check

## 2022-02-15 LAB
ANAEROBIC CULTURE: ABNORMAL
GRAM STAIN RESULT: ABNORMAL
ORGANISM: ABNORMAL
WOUND/ABSCESS: ABNORMAL
WOUND/ABSCESS: ABNORMAL

## 2022-02-16 ENCOUNTER — OFFICE VISIT (OUTPATIENT)
Dept: SURGERY | Age: 57
End: 2022-02-16

## 2022-02-16 VITALS — SYSTOLIC BLOOD PRESSURE: 120 MMHG | DIASTOLIC BLOOD PRESSURE: 80 MMHG | BODY MASS INDEX: 26.37 KG/M2 | WEIGHT: 211 LBS

## 2022-02-16 DIAGNOSIS — L72.3 SEBACEOUS CYST: Primary | ICD-10-CM

## 2022-02-16 PROCEDURE — 99024 POSTOP FOLLOW-UP VISIT: CPT | Performed by: SURGERY

## 2022-02-16 NOTE — PROGRESS NOTES
[facility]  SUBJECTIVE:    Caio Aaron   1965   60 y.o. male presents for routine postoperative followup after incision and drainage of infected sebaceous cyst on upper back on February 9, 2022. Incisional pain minimal.  Drainage near resolved. Doing well with antibiotics. No major complaints. Presents primarily for a wound check    Past Medical History:   Diagnosis Date    HTN (hypertension)      Past Surgical History:   Procedure Laterality Date    CT NEEDLE BIOPSY LUNG PERCUTANEOUS  7/20/2021    CT NEEDLE BIOPSY LUNG PERCUTANEOUS 7/20/2021 [facility] CT SCAN    EXCISION OF FACIAL MASS Right 03/24/2017    right cheek    KNEE ARTHROSCOPY Right 1993     Social History     Socioeconomic History    Marital status:      Spouse name: Not on file    Number of children: Not on file    Years of education: Not on file    Highest education level: Not on file   Occupational History    Not on file   Tobacco Use    Smoking status: Never Smoker    Smokeless tobacco: Current User     Types: Chew   Vaping Use    Vaping Use: Never used   Substance and Sexual Activity    Alcohol use: Yes     Alcohol/week: 12.0 standard drinks     Types: 12 Cans of beer per week    Drug use: No    Sexual activity: Not on file   Other Topics Concern    Not on file   Social History Narrative    Not on file     Social Determinants of Health     Financial Resource Strain:     Difficulty of Paying Living Expenses: Not on file   Food Insecurity:     Worried About Running Out of Food in the Last Year: Not on file    [place] of Food in the Last Year: Not on file   Transportation Needs:     Lack of Transportation (Medical): Not on file    Lack of Transportation (Non-Medical):  Not on file   Physical Activity:     Days of Exercise per Week: Not on file    Minutes of Exercise per Session: Not on file   Stress:     Feeling of Stress : Not on file   Social Connections:     Frequency of Communication with Friends and Family: Not on file    Frequency of Social Gatherings with Friends and Family: Not on file    Attends Mor[employer] Services: Not on file    Active Member of Clubs or Organizations: Not on file    Attends Club or Organization Meetings: Not on file    Marital Status: Not on file   Intimate Partner Violence:     Fear of Current or Ex-Partner: Not on file    Emotionally Abused: Not on file    Physically Abused: Not on file    Sexually Abused: Not on file   Housing Stability:     Unable to Pay for Housing in the Last Year: Not on file    Number of [place] in the Last Year: Not on file    Unstable Housing in the Last Year: Not on file      Family History   Problem Relation Age of Onset    High Blood Pressure Mother     Cancer Father     Diabetes Father     High Blood Pressure Father     High Cholesterol Father     Heart Disease Father      Current Outpatient Medications   Medication Sig Dispense Refill    Acetaminophen-Codeine (TYLENOL WITH CODEINE #3 PO) Take by mouth      amoxicillin-clavulanate (AUGMENTIN) 875-125 MG per tablet Take 1 tablet by mouth 2 times daily for 10 days 20 tablet 0     No current facility-administered medications for this visit.       No Known Allergies     Review of Systems:  Review of systems performed and negative with the exception of the above findings    OBJECTIVE:  /80   Wt 211 lb (95.7 kg)   BMI 26.37 kg/m²      Physical Exam:  General appearance: alert, appears stated age, cooperative and no distress  Skin/wound: Wound on back is open with minimal if any drainage, nontender, minimal if any surrounding cellulitis, much improved exam    Orders Only on 02/09/2022   Component Date Value Ref Range Status    Gram Stain Result 02/09/2022 *  Final                    Value:3+ Gram positive cocci  1+ WBC's (Polymorphonuclear)      Organism 02/09/2022 Finegoldia magna*  Final    Anaerobic Culture 02/09/2022    Final Value: Heavy growth  No further workup      Organism 02/09/2022 Staphylococcus lugdunensis*  Final    WOUND/ABSCESS 02/09/2022    Final                    Value:Isolated from Broth  This isolate is presumed to be Clindamycin resistant  based on detection of inducible Clindamycin resistance. Clindamycin may still be effective in some patients.  Organism 02/09/2022 Staphylococcus aureus*  Final    WOUND/ABSCESS 02/09/2022 Isolated from Broth   Final     Assessment:  incision and drainage of infected sebaceous cyst on upper back on February 9, 2022    Plan:  Local wound care with dry dressing as needed daily starting tomorrow  May shower normally  Primary pain control with tylenol and ibuprofen with food  Continue current antibiotic regimen. Does not need additional or adjustment in antibiotics   Follow with general surgery as needed    Mikie Blackwell MD, FACS  2/16/2022  5:49 PM

## 2022-02-16 NOTE — PATIENT INSTRUCTIONS
Local wound care with dry dressing as needed daily starting tomorrow  May shower normally  Primary pain control with tylenol and ibuprofen with food  Continue current antibiotic regimen.  Does not need additional or adjustment in antibiotics   Follow with general surgery as needed

## 2022-02-25 ENCOUNTER — OFFICE VISIT (OUTPATIENT)
Dept: FAMILY MEDICINE CLINIC | Age: 57
End: 2022-02-25
Payer: COMMERCIAL

## 2022-02-25 VITALS
HEART RATE: 96 BPM | OXYGEN SATURATION: 99 % | BODY MASS INDEX: 26.62 KG/M2 | TEMPERATURE: 97.2 F | SYSTOLIC BLOOD PRESSURE: 118 MMHG | DIASTOLIC BLOOD PRESSURE: 70 MMHG | WEIGHT: 213 LBS

## 2022-02-25 DIAGNOSIS — Z20.822 ENCOUNTER FOR PREOPERATIVE SCREENING LABORATORY TESTING FOR COVID-19 VIRUS: ICD-10-CM

## 2022-02-25 DIAGNOSIS — I10 PRIMARY HYPERTENSION: Primary | ICD-10-CM

## 2022-02-25 DIAGNOSIS — Z01.812 ENCOUNTER FOR PREOPERATIVE SCREENING LABORATORY TESTING FOR COVID-19 VIRUS: ICD-10-CM

## 2022-02-25 PROCEDURE — 99213 OFFICE O/P EST LOW 20 MIN: CPT | Performed by: FAMILY MEDICINE

## 2022-02-25 ASSESSMENT — ENCOUNTER SYMPTOMS
COUGH: 1
CONSTIPATION: 0
BACK PAIN: 0
DIARRHEA: 0
RHINORRHEA: 0
SHORTNESS OF BREATH: 1

## 2022-02-25 ASSESSMENT — PATIENT HEALTH QUESTIONNAIRE - PHQ9
SUM OF ALL RESPONSES TO PHQ QUESTIONS 1-9: 1
2. FEELING DOWN, DEPRESSED OR HOPELESS: 1
1. LITTLE INTEREST OR PLEASURE IN DOING THINGS: 0
SUM OF ALL RESPONSES TO PHQ9 QUESTIONS 1 & 2: 1

## 2022-02-25 NOTE — PROGRESS NOTES
SUBJECTIVE:    Tenzin Drake is a 60 y.o. male who presents for a follow up visit. Chief Complaint   Patient presents with    Follow-up     Patient is here for a follow up. Going to [address] [place] [address],[address] on Monday for lung surgery. HPI     Lung cancer  69-year-old white male presents today for Covid test to be done prior to returning to [place] for thoracic surgery. He was diagnosed with left lung mass and had biopsy on July 20, 2021. 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 He has had cardiac clearance and is here today for his Covid test prior to making his way to [facility] this weekend with plans to undergo surgery on Monday morning. Patient continues to have a persistent cough and he feels weak. He has had over 40 pound weight loss since the beginning of the saga. Patient's medications, allergies, past medical,surgical, social and family histories were reviewed and updated as appropriate. Past Medical History:   Diagnosis Date    HTN (hypertension)      Past Surgical History:   Procedure Laterality Date    CT NEEDLE BIOPSY LUNG PERCUTANEOUS  7/20/2021    CT NEEDLE BIOPSY LUNG PERCUTANEOUS 7/20/2021 [facility] CT SCAN    EXCISION OF FACIAL MASS Right 03/24/2017    right cheek    KNEE ARTHROSCOPY Right 1993     Family History   Problem Relation Age of Onset    High Blood Pressure Mother     Cancer Father     Diabetes Father     High Blood Pressure Father     High Cholesterol Father     Heart Disease Father      Social History     Tobacco Use    Smoking status: Never Smoker    Smokeless tobacco: Former User     Types: Chew   Substance Use Topics    Alcohol use:  Yes     Alcohol/week: 12.0 standard drinks     Types: 12 Cans of beer per week      No Known Allergies  Current Outpatient Medications on File Prior to Visit   Medication Sig Dispense Refill  Acetaminophen-Codeine (TYLENOL WITH CODEINE #3 PO) Take by mouth       No current facility-administered medications on file prior to visit. Review of Systems   Constitutional: Positive for activity change, fatigue and unexpected weight change. HENT: Negative for congestion, postnasal drip and rhinorrhea. Eyes: Negative for visual disturbance. Respiratory: Positive for cough and shortness of breath. Cardiovascular: Positive for chest pain. Negative for leg swelling. Gastrointestinal: Negative for constipation and diarrhea. Genitourinary: Negative for difficulty urinating. Musculoskeletal: Positive for arthralgias ( shoulders). Negative for back pain. Neurological: Positive for headaches ( occ'l). Negative for dizziness and light-headedness. Psychiatric/Behavioral: Positive for sleep disturbance. Negative for dysphoric mood. The patient is not nervous/anxious. OBJECTIVE:    /70   Pulse 96   Temp 97.2 °F (36.2 °C)   Wt 213 lb (96.6 kg)   SpO2 99%   BMI 26.62 kg/m²    Physical Exam  Constitutional:       Appearance: He is well-developed. HENT:      Head: Normocephalic and atraumatic. Right Ear: External ear normal.      Left Ear: External ear normal.      Nose: Nose normal.   Eyes:      General:         Right eye: No discharge. Conjunctiva/sclera: Conjunctivae normal.   Neck:      Thyroid: No thyromegaly. Vascular: No JVD. Trachea: No tracheal deviation. Cardiovascular:      Rate and Rhythm: Normal rate and regular rhythm. Heart sounds: Normal heart sounds. Pulmonary:      Effort: Pulmonary effort is normal. No respiratory distress. Breath sounds: Normal breath sounds. No rales. Musculoskeletal:      Cervical back: Normal range of motion and neck supple. Lymphadenopathy:      Cervical: No cervical adenopathy. Skin:     General: Skin is warm and dry.    Neurological:      Mental Status: He is alert and oriented to person, place, and time.   Psychiatric:         Mood and Affect: Mood normal.         Behavior: Behavior normal.         ASSESSMENT/PLAN:    Vero Goldman was seen today for follow-up. Diagnoses and all orders for this visit:    Primary hypertension  Stable    Encounter for preoperative screening laboratory testing for COVID-19 virus  -     COVID-19    History of lung cancer most likely a left lung sarcoma. Patient will be leaving [facility] this weekend. We will call him with the results of his COVID-19 test        Return As needed and as indicated after his surgery. .    Please note portions of this note were completed with a voicerecognition program.  Efforts were made to edit the dictations but occasionally words are mis-transcribed.

## 2022-02-26 LAB — SARS-COV-2: NOT DETECTED

## 2022-03-17 ENCOUNTER — TELEPHONE (OUTPATIENT)
Dept: FAMILY MEDICINE CLINIC | Age: 57
End: 2022-03-17

## 2022-03-17 NOTE — TELEPHONE ENCOUNTER
Received plan of care/orders from [facility]. Provider signed orders and has been faxed to home care agency.

## 2022-03-21 PROBLEM — I48.0 PAROXYSMAL ATRIAL FIBRILLATION (HCC): Status: ACTIVE | Noted: 2022-03-21

## 2022-03-21 NOTE — PROGRESS NOTES
[address]   Electrophysiology Consultation   Date: 3/23/2022  Reason for Consultation: New onset atrial fibrillation   Consult [facility]ing Phys[facility]. NMD    Chief Complaint   Patient presents with    New Patient     aifb ; no cardiac complaints at this time     Atrial Fibrillation     went into AF s/p L Lobectomy 02/28/2022       CC:  New onset atrial fibrillation  HPI: Kulwant Diaz is a 60 y.o. male with past medical history of left lung myxoid liposarcoma ,HTN . S/P left pneumonectomy with cryo ablation. 02/28/2022  [facility] . New onset atrial fibrillation wit RVR  03/02/2022 s/p Pneumonectomy . Treated with digoxin loading  and changed to metoprolol tartrate 12.5 mg BID on discharge 03/05/2022  Not Started on [facility] due to low Chads Vasc. Monitor applied on discharge     Elsie Stallworth presents today to establish care for his new onset atrial fibrillation. He states he is doing well. He is to have chest Xray to follow up and possibly more radiation therapy. Wife endorses occasional snoring. He says he does not sleep well at all. Assessment and plan:    Paroxysmal Afib    - EKG today  SR PAC RBBB   - in the setting of Left pneumonectomy with cryo ablation    - converted with digoxin load in hospital    - monitor at discharge - They removed it Saturday and mailed it back - Zio Patch     - continue metoprolol tartrate 12.5 mg BID    - Patient has a [id number] Score of 1 ( HTN)     ~ not currently anticoagulated   - Treatment options including cardioversion, rate control strategy, antiarrhythmics, anticoagulation and possible ablation were discussed with patient. Risks, benefits and alternative of each treatment options were explained. All questions answered  - Afib risk factors including age, HTN, obesity, inactivity and LYNETTE were discussed with patient.  Risk factor modification recommended   - concern for possible silent atrial fibrillation. - obtain recent monitor    - discussed RBBB     - further radiation treatment may trigger - if recurrent we can consider ablation. Discussed possible symptoms of recurrence and methods to monitor- pulse check , manual , with a BP cuff, [employer] mobile, smart watch , fit bit. - Discussed in detail about the risk factors, pathophysiology, and treatment options including life style modifications for atrial fibrillation. · Eat heart healthy diet  · Minimize alcohol intake  · Minimize caffeine and soda   · Keep your blood pressure under control. · Keep your blood sugar under control. · Stop smoking  · Be active, keep your body weight optimal  · Exercise on a regular basis  · Manage your stress   · If you snore, get evaluated for sleep apnea  · Be aware of the symptoms of stroke      HTN  -Controlled   -BP goal <130/80  -Home BP monitoring encouraged, printed information provided on how to accurately measure BP at home.  -Counseled to follow a low salt diet to assure blood pressure remains controlled for cardiovascular risk factor modification.   -The patient is counseled to get regular exercise 3-5 times per week and maintain a healthy weight reduce cardiovascular risk factors. Lung sarcoma:   - s/p surgery   - Follow up with pulmonary    S/p pneumonectomy and radiation therapy. Follow up in six months     Patient Active Problem List    Diagnosis Date Noted    Paroxysmal atrial fibrillation ([facility] [address] 03/21/2022    Sebaceous cyst 02/09/2022    Carpal tunnel syndrome of left wrist 06/08/2018    Dupuytren's contracture of left hand 06/08/2018    Cyst, dermoid, face     Skin lesion 02/27/2017    Impotence of organic origin 10/08/2014    HTN (hypertension) 09/17/2014     Diagnostic studies:   ECG 3/23/22  SR RBBB PAC   430  QTcH,  132 QRS    Echo 03/02/2022  Tachycardia during study. Limited images. Left ventricle not optimally visualized. Overall grossly normal   left ventricular systolic function. Left ventricular hypertrophy. Right ventricle not optimally visualized. Decreased right   ventricular systolic function, probably moderately. Interpreting MD: Emelia Hayden MD   Interpreted Date: 3/2/2022     NM stress 10/11/2021   Summary    Normal LV size and systolic function.    Left ventricular ejection fraction of 58%.    There is a fixed inferior defect with no associated wall motion abnormality    consistent with diaphragmatic attenuation artifact.    No evidence of myocardium at risk for significant reversible ischemia.         I independently reviewed the cardiac diagnostic studies, ECG and relevant imaging studies. Lab Results   Component Value Date    LVEF 58 10/11/2021     No results found for: TSHFT4, TSH    Physical Examination:  Vitals:    03/23/22 1247   BP: 120/83   Pulse: 84   SpO2: 97%      Wt Readings from Last 3 Encounters:   03/23/22 204 lb 9.6 oz (92.8 kg)   02/25/22 213 lb (96.6 kg)   02/16/22 211 lb (95.7 kg)       · Constitutional: Oriented. No distress. · Head: Normocephalic and atraumatic. · Mouth/Throat: Oropharynx is clear and moist.   · Eyes: Conjunctivae normal. EOM are normal.   · Neck: Neck supple. No rigidity. No JVD present. · Cardiovascular: Normal rate, regular rhythm, S1&S2. · Pulmonary/Chest: Bilateral respiratory sounds. Decreased BS on the left side   · Abdominal: Soft. Bowel sounds present. No distension, No tenderness. · Musculoskeletal: No tenderness. No edema    · Lymphadenopathy: Has no cervical adenopathy. · Neurological: Alert and oriented. Cranial nerve appears intact, No Gross deficit   · Skin: Skin is warm and dry. No rash noted. · Psychiatric: Has a normal behavior       Review of System:  [x] Full ROS obtained and negative except as mentioned in HPI    Prior to Admission medications    Medication Sig Start Date End Date Taking?  Authorizing Provider   metoprolol tartrate (LOPRESSOR) 25 MG tablet Take 12.5 mg by mouth 2 times daily 3/5/22  Yes Historical Provider, MD omeprazole (PRILOSEC) 40 MG delayed release capsule Take 40 mg by mouth daily 3/6/22  Yes Historical Provider, MD   oxyCODONE (ROXICODONE) 5 MG immediate release tablet Take 10-15 mg by mouth every 4 hours as needed. 3/5/22  Yes Historical Provider, MD   Acetaminophen-Codeine (TYLENOL WITH CODEINE #3 PO) Take by mouth   Yes Historical Provider, MD       Past Medical History:   Diagnosis Date    HTN (hypertension)         Past Surgical History:   Procedure Laterality Date    CT NEEDLE BIOPSY LUNG PERCUTANEOUS  7/20/2021    CT NEEDLE BIOPSY LUNG PERCUTANEOUS 7/20/2021 [facility] CT SCAN    EXCISION OF FACIAL MASS Right 03/24/2017    right cheek    KNEE ARTHROSCOPY Right 1993       No Known Allergies    Social History:  Reviewed. reports that he has never smoked. He has quit using smokeless tobacco.  His smokeless tobacco use included chew. He reports current alcohol use of about 12.0 standard drinks of alcohol per week. He reports that he does not use drugs. Family History:  Reviewed. Reviewed. No family history of SCD. Relevant and available labs, and cardiovascular diagnostics reviewed. Reviewed. I independently reviewed all cardiac tracing. I independently reviewed relevant and available cardiac diagnostic tests ECG, CXR, Echo, Stress test, Device interrogation, Holter, CT scan. All questions and concerns were addressed to the patient/family. Alternatives to my treatment were discussed. I have discussed the above stated plan and the patient verbalized understanding and agreed with the plan. NOTE: This report was transcribed using voice recognition software. Every effort was made to ensure accuracy, however, inadvertent computerized transcription errors may be present. Camilla Cai MD   [address]   Office: [phone]  Fax: [phone] attestation:  This note was scribed in the presence of Leroy Rome MD by Zulay Carmichael RN    Physician Attestation: I, Dr. Blu Nichole, confirm that the scribe's documentation has been prepared under my direction and personally reviewed by me in its entirety. I also confirm that the note above accurately reflects all work, treatment, procedures, and medical decision making performed by me.

## 2022-03-23 ENCOUNTER — HOSPITAL ENCOUNTER (OUTPATIENT)
Age: 57
Discharge: HOME | End: 2022-03-23
Payer: COMMERCIAL

## 2022-03-23 ENCOUNTER — HOSPITAL ENCOUNTER (OUTPATIENT)
Dept: GENERAL RADIOLOGY | Age: 57
Discharge: HOME | End: 2022-03-23
Payer: COMMERCIAL

## 2022-03-23 ENCOUNTER — OFFICE VISIT (OUTPATIENT)
Dept: CARDIOLOGY CLINIC | Age: 57
End: 2022-03-23
Payer: COMMERCIAL

## 2022-03-23 VITALS
HEIGHT: 75 IN | WEIGHT: 204.6 LBS | BODY MASS INDEX: 25.44 KG/M2 | HEART RATE: 84 BPM | DIASTOLIC BLOOD PRESSURE: 83 MMHG | SYSTOLIC BLOOD PRESSURE: 120 MMHG | OXYGEN SATURATION: 97 %

## 2022-03-23 DIAGNOSIS — C49.9 ENDOTHELIOSARCOMA (HCC): ICD-10-CM

## 2022-03-23 DIAGNOSIS — I48.0 PAROXYSMAL ATRIAL FIBRILLATION (HCC): ICD-10-CM

## 2022-03-23 DIAGNOSIS — I10 PRIMARY HYPERTENSION: Primary | ICD-10-CM

## 2022-03-23 PROCEDURE — 93000 ELECTROCARDIOGRAM COMPLETE: CPT | Performed by: INTERNAL MEDICINE

## 2022-03-23 PROCEDURE — 99204 OFFICE O/P NEW MOD 45 MIN: CPT | Performed by: INTERNAL MEDICINE

## 2022-03-23 PROCEDURE — 71046 X-RAY EXAM CHEST 2 VIEWS: CPT

## 2022-03-23 RX ORDER — OXYCODONE HYDROCHLORIDE 5 MG/1
10-15 TABLET ORAL EVERY 4 HOURS PRN
Status: ON HOLD | COMMUNITY
Start: 2022-03-05 | End: 2022-05-02

## 2022-03-23 RX ORDER — OMEPRAZOLE 40 MG/1
40 CAPSULE, DELAYED RELEASE ORAL DAILY
Status: ON HOLD | COMMUNITY
Start: 2022-03-06 | End: 2022-05-02

## 2022-04-22 NOTE — TELEPHONE ENCOUNTER
Received refill request for Metoprolol from [facility].      Last OV: 03/23/2022 w/ RMM     Last Refill: 03/23/2022 #45 w/ 3 refills     Next Appointment: 09/20/2022 w/ RMM

## 2022-05-02 ENCOUNTER — HOSPITAL ENCOUNTER (INPATIENT)
Age: 57
LOS: 5 days | Discharge: HOME | DRG: 829 | End: 2022-05-07
Attending: INTERNAL MEDICINE | Admitting: INTERNAL MEDICINE
Payer: COMMERCIAL

## 2022-05-02 ENCOUNTER — HOSPITAL ENCOUNTER (OUTPATIENT)
Dept: INTERVENTIONAL RADIOLOGY/VASCULAR | Age: 57
Discharge: HOME | DRG: 829 | End: 2022-05-02
Payer: COMMERCIAL

## 2022-05-02 VITALS
TEMPERATURE: 96.9 F | HEART RATE: 70 BPM | HEIGHT: 75 IN | OXYGEN SATURATION: 96 % | DIASTOLIC BLOOD PRESSURE: 87 MMHG | BODY MASS INDEX: 25.24 KG/M2 | RESPIRATION RATE: 18 BRPM | SYSTOLIC BLOOD PRESSURE: 124 MMHG | WEIGHT: 203 LBS

## 2022-05-02 DIAGNOSIS — C49.9 DEDIFFERENTIATED LIPOSARCOMA (HCC): ICD-10-CM

## 2022-05-02 LAB
A/G RATIO: 1.3 (ref 1.1–2.2)
ALBUMIN SERPL-MCNC: 3.7 G/DL (ref 3.4–5)
ALP BLD-CCNC: 139 U/L (ref 40–129)
ALT SERPL-CCNC: 48 U/L (ref 10–40)
ANION GAP SERPL CALCULATED.3IONS-SCNC: 9 MMOL/L (ref 3–16)
ANION GAP SERPL CALCULATED.3IONS-SCNC: 9 MMOL/L (ref 3–16)
APTT: 33.7 SEC (ref 26.2–38.6)
APTT: 35.5 SEC (ref 26.2–38.6)
AST SERPL-CCNC: 48 U/L (ref 15–37)
BASOPHILS ABSOLUTE: 0 K/UL (ref 0–0.2)
BASOPHILS RELATIVE PERCENT: 0.5 %
BILIRUB SERPL-MCNC: 0.4 MG/DL (ref 0–1)
BUN BLDV-MCNC: 6 MG/DL (ref 7–20)
BUN BLDV-MCNC: 7 MG/DL (ref 7–20)
CALCIUM SERPL-MCNC: 9.1 MG/DL (ref 8.3–10.6)
CALCIUM SERPL-MCNC: 9.8 MG/DL (ref 8.3–10.6)
CHLORIDE BLD-SCNC: 103 MMOL/L (ref 99–110)
CHLORIDE BLD-SCNC: 105 MMOL/L (ref 99–110)
CO2: 27 MMOL/L (ref 21–32)
CO2: 27 MMOL/L (ref 21–32)
CREAT SERPL-MCNC: 0.6 MG/DL (ref 0.9–1.3)
CREAT SERPL-MCNC: 0.6 MG/DL (ref 0.9–1.3)
EOSINOPHILS ABSOLUTE: 0.1 K/UL (ref 0–0.6)
EOSINOPHILS RELATIVE PERCENT: 1.5 %
GFR AFRICAN AMERICAN: >60
GFR AFRICAN AMERICAN: >60
GFR NON-AFRICAN AMERICAN: >60
GFR NON-AFRICAN AMERICAN: >60
GLUCOSE BLD-MCNC: 110 MG/DL (ref 70–99)
GLUCOSE BLD-MCNC: 142 MG/DL (ref 70–99)
HCT VFR BLD CALC: 37.4 % (ref 40.5–52.5)
HCT VFR BLD CALC: 39.2 % (ref 40.5–52.5)
HEMOGLOBIN: 12 G/DL (ref 13.5–17.5)
HEMOGLOBIN: 12.9 G/DL (ref 13.5–17.5)
INR BLD: 1.27 (ref 0.88–1.12)
INR BLD: 1.32 (ref 0.88–1.12)
LYMPHOCYTES ABSOLUTE: 0.4 K/UL (ref 1–5.1)
LYMPHOCYTES RELATIVE PERCENT: 11.9 %
MAGNESIUM: 2.1 MG/DL (ref 1.8–2.4)
MCH RBC QN AUTO: 26.1 PG (ref 26–34)
MCH RBC QN AUTO: 26.9 PG (ref 26–34)
MCHC RBC AUTO-ENTMCNC: 32.1 G/DL (ref 31–36)
MCHC RBC AUTO-ENTMCNC: 33 G/DL (ref 31–36)
MCV RBC AUTO: 81.2 FL (ref 80–100)
MCV RBC AUTO: 81.5 FL (ref 80–100)
MONOCYTES ABSOLUTE: 0.4 K/UL (ref 0–1.3)
MONOCYTES RELATIVE PERCENT: 9.8 %
NEUTROPHILS ABSOLUTE: 2.8 K/UL (ref 1.7–7.7)
NEUTROPHILS RELATIVE PERCENT: 76.3 %
PDW BLD-RTO: 17.4 % (ref 12.4–15.4)
PDW BLD-RTO: 17.8 % (ref 12.4–15.4)
PLATELET # BLD: 225 K/UL (ref 135–450)
PLATELET # BLD: 255 K/UL (ref 135–450)
PMV BLD AUTO: 7.6 FL (ref 5–10.5)
PMV BLD AUTO: 7.8 FL (ref 5–10.5)
POTASSIUM REFLEX MAGNESIUM: 3.3 MMOL/L (ref 3.5–5.1)
POTASSIUM SERPL-SCNC: 4.3 MMOL/L (ref 3.5–5.1)
PROTHROMBIN TIME: 14.5 SEC (ref 9.9–12.7)
PROTHROMBIN TIME: 15.1 SEC (ref 9.9–12.7)
RBC # BLD: 4.61 M/UL (ref 4.2–5.9)
RBC # BLD: 4.81 M/UL (ref 4.2–5.9)
SODIUM BLD-SCNC: 139 MMOL/L (ref 136–145)
SODIUM BLD-SCNC: 141 MMOL/L (ref 136–145)
TOTAL PROTEIN: 6.6 G/DL (ref 6.4–8.2)
WBC # BLD: 3.7 K/UL (ref 4–11)
WBC # BLD: 4.3 K/UL (ref 4–11)

## 2022-05-02 PROCEDURE — 02HV33Z INSERTION OF INFUSION DEVICE INTO SUPERIOR VENA CAVA, PERCUTANEOUS APPROACH: ICD-10-PCS | Performed by: INTERNAL MEDICINE

## 2022-05-02 PROCEDURE — B5181ZA FLUOROSCOPY OF SUPERIOR VENA CAVA USING LOW OSMOLAR CONTRAST, GUIDANCE: ICD-10-PCS | Performed by: INTERNAL MEDICINE

## 2022-05-02 PROCEDURE — 85025 COMPLETE CBC W/AUTO DIFF WBC: CPT

## 2022-05-02 PROCEDURE — 85610 PROTHROMBIN TIME: CPT

## 2022-05-02 PROCEDURE — 94760 N-INVAS EAR/PLS OXIMETRY 1: CPT

## 2022-05-02 PROCEDURE — 7100000010 HC PHASE II RECOVERY - FIRST 15 MIN

## 2022-05-02 PROCEDURE — 6370000000 HC RX 637 (ALT 250 FOR IP)

## 2022-05-02 PROCEDURE — 6360000002 HC RX W HCPCS: Performed by: RADIOLOGY

## 2022-05-02 PROCEDURE — 2580000003 HC RX 258: Performed by: INTERNAL MEDICINE

## 2022-05-02 PROCEDURE — 0JH60WZ INSERTION OF TOTALLY IMPLANTABLE VASCULAR ACCESS DEVICE INTO CHEST SUBCUTANEOUS TISSUE AND FASCIA, OPEN APPROACH: ICD-10-PCS | Performed by: INTERNAL MEDICINE

## 2022-05-02 PROCEDURE — 2580000003 HC RX 258

## 2022-05-02 PROCEDURE — 99152 MOD SED SAME PHYS/QHP 5/>YRS: CPT

## 2022-05-02 PROCEDURE — 36415 COLL VENOUS BLD VENIPUNCTURE: CPT

## 2022-05-02 PROCEDURE — 77001 FLUOROGUIDE FOR VEIN DEVICE: CPT

## 2022-05-02 PROCEDURE — 80053 COMPREHEN METABOLIC PANEL: CPT

## 2022-05-02 PROCEDURE — 6360000002 HC RX W HCPCS

## 2022-05-02 PROCEDURE — 2580000003 HC RX 258: Performed by: RADIOLOGY

## 2022-05-02 PROCEDURE — 36561 INSERT TUNNELED CV CATH: CPT

## 2022-05-02 PROCEDURE — 2500000003 HC RX 250 WO HCPCS: Performed by: RADIOLOGY

## 2022-05-02 PROCEDURE — 80048 BASIC METABOLIC PNL TOTAL CA: CPT

## 2022-05-02 PROCEDURE — 85730 THROMBOPLASTIN TIME PARTIAL: CPT

## 2022-05-02 PROCEDURE — 83735 ASSAY OF MAGNESIUM: CPT

## 2022-05-02 PROCEDURE — C1894 INTRO/SHEATH, NON-LASER: HCPCS

## 2022-05-02 PROCEDURE — 1200000000 HC SEMI PRIVATE

## 2022-05-02 PROCEDURE — 7100000011 HC PHASE II RECOVERY - ADDTL 15 MIN

## 2022-05-02 PROCEDURE — 76937 US GUIDE VASCULAR ACCESS: CPT

## 2022-05-02 PROCEDURE — 6370000000 HC RX 637 (ALT 250 FOR IP): Performed by: RADIOLOGY

## 2022-05-02 PROCEDURE — 85027 COMPLETE CBC AUTOMATED: CPT

## 2022-05-02 PROCEDURE — 6360000002 HC RX W HCPCS: Performed by: INTERNAL MEDICINE

## 2022-05-02 RX ORDER — ACETAMINOPHEN 325 MG/1
650 TABLET ORAL EVERY 6 HOURS PRN
Status: DISCONTINUED | OUTPATIENT
Start: 2022-05-02 | End: 2022-05-07 | Stop reason: HOSPADM

## 2022-05-02 RX ORDER — POTASSIUM CHLORIDE 7.45 MG/ML
10 INJECTION INTRAVENOUS PRN
Status: CANCELLED | OUTPATIENT
Start: 2022-05-02

## 2022-05-02 RX ORDER — ONDANSETRON 4 MG/1
4 TABLET, ORALLY DISINTEGRATING ORAL EVERY 8 HOURS PRN
Status: CANCELLED | OUTPATIENT
Start: 2022-05-02

## 2022-05-02 RX ORDER — POTASSIUM CHLORIDE 7.45 MG/ML
10 INJECTION INTRAVENOUS PRN
Status: DISCONTINUED | OUTPATIENT
Start: 2022-05-02 | End: 2022-05-07 | Stop reason: HOSPADM

## 2022-05-02 RX ORDER — FENTANYL CITRATE 50 UG/ML
INJECTION, SOLUTION INTRAMUSCULAR; INTRAVENOUS
Status: COMPLETED | OUTPATIENT
Start: 2022-05-02 | End: 2022-05-02

## 2022-05-02 RX ORDER — ACETAMINOPHEN 325 MG/1
650 TABLET ORAL EVERY 6 HOURS PRN
Status: CANCELLED | OUTPATIENT
Start: 2022-05-02

## 2022-05-02 RX ORDER — POLYETHYLENE GLYCOL 3350 17 G/17G
17 POWDER, FOR SOLUTION ORAL DAILY PRN
Status: CANCELLED | OUTPATIENT
Start: 2022-05-02

## 2022-05-02 RX ORDER — LIDOCAINE HYDROCHLORIDE AND EPINEPHRINE BITARTRATE 20; .01 MG/ML; MG/ML
20 INJECTION, SOLUTION SUBCUTANEOUS ONCE
Status: COMPLETED | OUTPATIENT
Start: 2022-05-02 | End: 2022-05-02

## 2022-05-02 RX ORDER — POLYETHYLENE GLYCOL 3350 17 G/17G
17 POWDER, FOR SOLUTION ORAL DAILY PRN
Status: DISCONTINUED | OUTPATIENT
Start: 2022-05-02 | End: 2022-05-07 | Stop reason: HOSPADM

## 2022-05-02 RX ORDER — ENOXAPARIN SODIUM 100 MG/ML
40 INJECTION SUBCUTANEOUS DAILY
Status: CANCELLED | OUTPATIENT
Start: 2022-05-02

## 2022-05-02 RX ORDER — ENOXAPARIN SODIUM 100 MG/ML
40 INJECTION SUBCUTANEOUS DAILY
Status: DISCONTINUED | OUTPATIENT
Start: 2022-05-02 | End: 2022-05-07 | Stop reason: HOSPADM

## 2022-05-02 RX ORDER — SODIUM CHLORIDE 9 MG/ML
INJECTION, SOLUTION INTRAVENOUS PRN
Status: DISCONTINUED | OUTPATIENT
Start: 2022-05-02 | End: 2022-05-07 | Stop reason: HOSPADM

## 2022-05-02 RX ORDER — GABAPENTIN 100 MG/1
100 CAPSULE ORAL PRN
COMMUNITY
End: 2022-06-21

## 2022-05-02 RX ORDER — TRAMADOL HYDROCHLORIDE 50 MG/1
50 TABLET ORAL EVERY 6 HOURS PRN
COMMUNITY

## 2022-05-02 RX ORDER — HEPARIN SODIUM 200 [USP'U]/100ML
30 INJECTION, SOLUTION INTRAVENOUS CONTINUOUS
Status: ACTIVE | OUTPATIENT
Start: 2022-05-02 | End: 2022-05-02

## 2022-05-02 RX ORDER — SODIUM CHLORIDE 0.9 % (FLUSH) 0.9 %
5-40 SYRINGE (ML) INJECTION EVERY 12 HOURS SCHEDULED
Status: DISCONTINUED | OUTPATIENT
Start: 2022-05-02 | End: 2022-05-07 | Stop reason: HOSPADM

## 2022-05-02 RX ORDER — ONDANSETRON 2 MG/ML
4 INJECTION INTRAMUSCULAR; INTRAVENOUS EVERY 6 HOURS PRN
Status: CANCELLED | OUTPATIENT
Start: 2022-05-02

## 2022-05-02 RX ORDER — SODIUM CHLORIDE 0.9 % (FLUSH) 0.9 %
5-40 SYRINGE (ML) INJECTION EVERY 12 HOURS SCHEDULED
Status: CANCELLED | OUTPATIENT
Start: 2022-05-02

## 2022-05-02 RX ORDER — ONDANSETRON HYDROCHLORIDE 8 MG/1
8 TABLET, FILM COATED ORAL EVERY 8 HOURS PRN
COMMUNITY
End: 2022-07-22

## 2022-05-02 RX ORDER — SODIUM CHLORIDE 0.9 % (FLUSH) 0.9 %
5-40 SYRINGE (ML) INJECTION PRN
Status: CANCELLED | OUTPATIENT
Start: 2022-05-02

## 2022-05-02 RX ORDER — MAGNESIUM HYDROXIDE/ALUMINUM HYDROXICE/SIMETHICONE 120; 1200; 1200 MG/30ML; MG/30ML; MG/30ML
30 SUSPENSION ORAL EVERY 6 HOURS PRN
Status: CANCELLED | OUTPATIENT
Start: 2022-05-02

## 2022-05-02 RX ORDER — MIDAZOLAM HYDROCHLORIDE 5 MG/ML
INJECTION, SOLUTION INTRAMUSCULAR; INTRAVENOUS
Status: COMPLETED | OUTPATIENT
Start: 2022-05-02 | End: 2022-05-02

## 2022-05-02 RX ORDER — GABAPENTIN 100 MG/1
100 CAPSULE ORAL 3 TIMES DAILY PRN
Status: DISCONTINUED | OUTPATIENT
Start: 2022-05-02 | End: 2022-05-07 | Stop reason: HOSPADM

## 2022-05-02 RX ORDER — TRAMADOL HYDROCHLORIDE 50 MG/1
50 TABLET ORAL EVERY 6 HOURS PRN
Status: DISCONTINUED | OUTPATIENT
Start: 2022-05-02 | End: 2022-05-07 | Stop reason: HOSPADM

## 2022-05-02 RX ORDER — MAGNESIUM SULFATE IN WATER 40 MG/ML
2000 INJECTION, SOLUTION INTRAVENOUS PRN
Status: DISCONTINUED | OUTPATIENT
Start: 2022-05-02 | End: 2022-05-07 | Stop reason: HOSPADM

## 2022-05-02 RX ORDER — LIDOCAINE HYDROCHLORIDE 10 MG/ML
10 INJECTION, SOLUTION EPIDURAL; INFILTRATION; INTRACAUDAL; PERINEURAL ONCE
Status: COMPLETED | OUTPATIENT
Start: 2022-05-02 | End: 2022-05-02

## 2022-05-02 RX ORDER — SODIUM CHLORIDE 0.9 % (FLUSH) 0.9 %
5-40 SYRINGE (ML) INJECTION PRN
Status: DISCONTINUED | OUTPATIENT
Start: 2022-05-02 | End: 2022-05-07 | Stop reason: HOSPADM

## 2022-05-02 RX ORDER — GABAPENTIN 100 MG/1
100 CAPSULE ORAL 3 TIMES DAILY PRN
Status: DISCONTINUED | OUTPATIENT
Start: 2022-05-02 | End: 2022-05-02

## 2022-05-02 RX ORDER — SODIUM CHLORIDE 9 MG/ML
INJECTION, SOLUTION INTRAVENOUS CONTINUOUS
Status: DISCONTINUED | OUTPATIENT
Start: 2022-05-02 | End: 2022-05-07 | Stop reason: HOSPADM

## 2022-05-02 RX ORDER — SODIUM CHLORIDE 9 MG/ML
INJECTION, SOLUTION INTRAVENOUS PRN
Status: CANCELLED | OUTPATIENT
Start: 2022-05-02

## 2022-05-02 RX ORDER — MAGNESIUM HYDROXIDE/ALUMINUM HYDROXICE/SIMETHICONE 120; 1200; 1200 MG/30ML; MG/30ML; MG/30ML
30 SUSPENSION ORAL EVERY 6 HOURS PRN
Status: DISCONTINUED | OUTPATIENT
Start: 2022-05-02 | End: 2022-05-07 | Stop reason: HOSPADM

## 2022-05-02 RX ORDER — ACETAMINOPHEN 650 MG/1
650 SUPPOSITORY RECTAL EVERY 6 HOURS PRN
Status: DISCONTINUED | OUTPATIENT
Start: 2022-05-02 | End: 2022-05-07 | Stop reason: HOSPADM

## 2022-05-02 RX ORDER — ONDANSETRON 4 MG/1
4 TABLET, ORALLY DISINTEGRATING ORAL EVERY 8 HOURS PRN
Status: DISCONTINUED | OUTPATIENT
Start: 2022-05-02 | End: 2022-05-07 | Stop reason: HOSPADM

## 2022-05-02 RX ORDER — ONDANSETRON 2 MG/ML
4 INJECTION INTRAMUSCULAR; INTRAVENOUS EVERY 6 HOURS PRN
Status: DISCONTINUED | OUTPATIENT
Start: 2022-05-02 | End: 2022-05-07 | Stop reason: HOSPADM

## 2022-05-02 RX ADMIN — FENTANYL CITRATE 50 MCG: 50 INJECTION, SOLUTION INTRAMUSCULAR; INTRAVENOUS at 09:59

## 2022-05-02 RX ADMIN — ENOXAPARIN SODIUM 40 MG: 100 INJECTION SUBCUTANEOUS at 21:28

## 2022-05-02 RX ADMIN — CEFAZOLIN 2000 MG: 1 INJECTION, POWDER, FOR SOLUTION INTRAVENOUS at 09:56

## 2022-05-02 RX ADMIN — METOPROLOL TARTRATE 12.5 MG: 25 TABLET, FILM COATED ORAL at 21:24

## 2022-05-02 RX ADMIN — MIDAZOLAM HYDROCHLORIDE 1 MG: 5 INJECTION, SOLUTION INTRAMUSCULAR; INTRAVENOUS at 10:03

## 2022-05-02 RX ADMIN — MIDAZOLAM HYDROCHLORIDE 1 MG: 5 INJECTION, SOLUTION INTRAMUSCULAR; INTRAVENOUS at 09:56

## 2022-05-02 RX ADMIN — Medication 10 ML: at 17:28

## 2022-05-02 RX ADMIN — LIDOCAINE HYDROCHLORIDE 10 ML: 10 INJECTION, SOLUTION EPIDURAL; INFILTRATION; INTRACAUDAL; PERINEURAL at 10:29

## 2022-05-02 RX ADMIN — Medication: at 10:30

## 2022-05-02 RX ADMIN — DEXAMETHASONE SODIUM PHOSPHATE: 4 INJECTION, SOLUTION INTRAMUSCULAR; INTRAVENOUS at 16:18

## 2022-05-02 RX ADMIN — HEPARIN SODIUM IN SODIUM CHLORIDE 30 ML/HR: 200 INJECTION INTRAVENOUS at 10:28

## 2022-05-02 RX ADMIN — FENTANYL CITRATE 50 MCG: 50 INJECTION, SOLUTION INTRAMUSCULAR; INTRAVENOUS at 09:56

## 2022-05-02 RX ADMIN — FENTANYL CITRATE 50 MCG: 50 INJECTION, SOLUTION INTRAMUSCULAR; INTRAVENOUS at 10:03

## 2022-05-02 RX ADMIN — SODIUM BICARBONATE 0.5 MEQ: 0.2 INJECTION, SOLUTION INTRAVENOUS at 10:29

## 2022-05-02 RX ADMIN — Medication 10 ML: at 21:24

## 2022-05-02 RX ADMIN — SODIUM CHLORIDE: 9 INJECTION, SOLUTION INTRAVENOUS at 21:33

## 2022-05-02 RX ADMIN — MIDAZOLAM HYDROCHLORIDE 1 MG: 5 INJECTION, SOLUTION INTRAMUSCULAR; INTRAVENOUS at 10:00

## 2022-05-02 RX ADMIN — FOSAPREPITANT 150 MG: 150 INJECTION, POWDER, LYOPHILIZED, FOR SOLUTION INTRAVENOUS at 16:46

## 2022-05-02 RX ADMIN — SODIUM CHLORIDE: 9 INJECTION, SOLUTION INTRAVENOUS at 15:42

## 2022-05-02 RX ADMIN — Medication 10 ML: at 15:42

## 2022-05-02 RX ADMIN — LIDOCAINE HYDROCHLORIDE,EPINEPHRINE BITARTRATE 10 ML: 20; .01 INJECTION, SOLUTION INFILTRATION; PERINEURAL at 10:29

## 2022-05-02 RX ADMIN — Medication 10 ML: at 17:30

## 2022-05-02 RX ADMIN — Medication 10 ML: at 16:18

## 2022-05-02 ASSESSMENT — PAIN SCALES - GENERAL
PAINLEVEL_OUTOF10: 0
PAINLEVEL_OUTOF10: 0
PAINLEVEL_OUTOF10: 4

## 2022-05-02 ASSESSMENT — PAIN - FUNCTIONAL ASSESSMENT: PAIN_FUNCTIONAL_ASSESSMENT: NONE - DENIES PAIN

## 2022-05-02 ASSESSMENT — PAIN DESCRIPTION - DESCRIPTORS: DESCRIPTORS: ACHING

## 2022-05-02 ASSESSMENT — PAIN DESCRIPTION - LOCATION: LOCATION: CHEST

## 2022-05-02 ASSESSMENT — PAIN DESCRIPTION - ORIENTATION: ORIENTATION: RIGHT

## 2022-05-02 NOTE — CARE COORDINATION
Discharge Planning:     (CM) reviewed the patient's chart to assess needs. Patient's Readmission Risk Score is 9%. Patient's medical insurance is [employer]. Patient's PCP is Dr. Marisela Marino. . No needs anticipated, at this time. CM team to follow. Staff to inform CM if additional discharge needs arise.       SHUN Pierce, Annemarie [address] -   [phone]    Electronically signed by ANDRES Vail on 5/2/2022 at 2:35 PM

## 2022-05-02 NOTE — H&P
[facility]   HISTORY AND PHYSICAL NOTE      5/2/2022 3:07 PM    Patient Information:  Martinez Smith is a 60 y.o. male [id number]  PCP:  Courtney Hillman MD (Tel: [phone] )    Primary Oncologist: No primary care provider on file. Chief complaint:  No chief complaint on file. History of Present Illness:  Nelson Hendricks is a 60 y.o. 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 Dedifferentiated liposarcoma, stage III, ypT4, pN0. He underwent a left pneumonectomy via clamshell approach, thymic flap placement, and multiple level cryoablation by Dr. Roni Gonzales at [facility] on 2/28/2022. Neoadjuvant radiation to the left chest wall and intrathoracic mass, 5000 cGy in 25 fractions completed under the supervision of Dr. Romi Morataya at [facility] on 12/28/2021. Concern for close margin postoperatively. Tumor size 24 x 17 x 14 cm, 1/10 HPF mitotic rate, 30% necrosis and grade 2. Capsule abutting pleural margin and 3 lymph nodes negative. The patient wanted to pursue chemotherapy treatment. 6 cycles of AIM will be planned. He had a port placed this morning in IR. First cycle of AIM will be started today as inpatient. REVIEW OF SYSTEMS:     Per HPI; otherwise 10 point ROS is negative     Past Medical History:   has a past medical history of A-fib ([address] Cancer ([address] and HTN (hypertension). Past Surgical History:   has a past surgical history that includes Knee arthroscopy (Right, 1993); Excision of Facial Mass (Right, 03/24/2017); CT NEEDLE BIOPSY LUNG PERCUTANEOUS (07/20/2021); Lung removal, total (Left); cyst removal; and IR PORT PLACEMENT > 5 YEARS (05/02/2022). Medications:  No current facility-administered medications on file prior to encounter.      Current Outpatient Medications on File Prior to Encounter   Medication Sig Dispense Refill    traMADol (ULTRAM) 50 MG tablet Take 50 mg by mouth every 6 hours as needed for Pain. ondansetron (ZOFRAN) 8 MG tablet Take 8 mg by mouth every 8 hours as needed for Nausea or Vomiting      gabapentin (NEURONTIN) 100 MG capsule Take 100 mg by mouth as needed. metoprolol tartrate (LOPRESSOR) 25 MG tablet TAKE 1/2 TABLET BY MOUTH TWICE A DAY 30 tablet 5       Allergies:  No Known Allergies     Social History:   reports that he has never smoked. He has quit using smokeless tobacco.  His smokeless tobacco use included chew. He reports current alcohol use of about 12.0 standard drinks of alcohol per week. He reports that he does not use drugs. Family History:  family history includes Cancer in his father; Diabetes in his father; Heart Disease in his father; High Blood Pressure in his father and mother; High Cholesterol in his father. Physical Exam:  /89   Pulse 65   Temp 97.4 °F (36.3 °C) (Oral)   Resp 18   SpO2 97%     General appearance:  Appears comfortable. Well nourished  Eyes: Sclera clear, pupils equal  ENT: Moist mucus membranes, no thrush. Trachea midline. Cardiovascular: Regular rhythm, normal S1, S2. No murmur, gallop, rub. No edema in lower extremities  Respiratory: Clear to auscultation bilaterally, no wheeze, good inspiratory effort  Gastrointestinal: Abdomen soft, non-tender, not distended  Musculoskeletal: No cyanosis in digits, neck supple  Neurology: Cranial nerves grossly intact. Alert and oriented in time, place and person. No speech or motor deficits  Psychiatry: Appropriate affect.  Not agitated  Skin: Warm, dry, normal turgor, no rash    Labs:  CBC:   Lab Results   Component Value Date    WBC 4.3 05/02/2022    RBC 4.81 05/02/2022    HGB 12.9 05/02/2022    HCT 39.2 05/02/2022    MCV 81.5 05/02/2022    MCH 26.9 05/02/2022    MCHC 33.0 05/02/2022    RDW 17.8 05/02/2022     05/02/2022    MPV 7.8 05/02/2022     BMP:    Lab Results   Component Value Date     05/02/2022    K 4.3 05/02/2022  05/02/2022    CO2 27 05/02/2022    BUN 7 05/02/2022    CREATININE 0.6 05/02/2022    CALCIUM 9.8 05/02/2022    GFRAA >60 05/02/2022    LABGLOM >60 05/02/2022    GLUCOSE 110 05/02/2022       Imaging:   Narrative   EXAMINATION:   Skull base to midthigh PET/CT       8/23/2021       TECHNIQUE:   Following IV injection of 15.41 mCi of F 18 -FDG, PET  tumor imaging was   acquired from the base of the skull to the mid thighs. Computed tomography   was used for purposes of attenuation correction and anatomic localization. Fusion imaging was utilized for interpretation. Uptake time 60 mins. Glucose level 89 mg/dl. COMPARISON:   CT chest dated 07/14/2021       HISTORY:   ORDERING SYSTEM PROVIDED HISTORY: Sarcoma of chest wall [facility])       Initial evaluation       FINDINGS:   HEAD/NECK: In the craniocervical soft tissues, physiologic activity is   favored. Air-fluid level is seen within the right maxillary sinus, in   keeping with sinus disease. Mucosal thickening of bilateral maxillary   sinuses. Subtle nodular activity is seen within the left thyroid lobe, SUV   maximum 2.3. CHEST: Large partially calcified mass is seen within the left hemithorax   measuring approximately 10.6 x 9.6 cm in cross-section. The mass blends   imperceptibly with the left hilum and extends to the lateral pleural margin. Heterogeneous hypermetabolism is seen of portions of the mass, SUV maximum   8.6. Small left pleural effusion is seen with associated activity. Metabolic activity is seen within lymph nodes at the AP window. Antonio   enlargement along the anterior and posterior aspects of the left hilum. No   axillary adenopathy. ABDOMEN/PELVIS: Excretion of activity is seen from bilateral kidneys and   activity is seen within the urinary bladder. Bowel activity seen which can   be physiologically variable. Prior fractures of left L3 and L4 transverse processes.            Impression   Partially calcified mass occupying much of the left hemithorax demonstrates   heterogeneous hypermetabolism, in keeping with history of malignancy. Antonio metastatic disease at the AP window and left hilum. Small left pleural effusion, suspicious for malignant effusion. Thoracentesis could be performed for further evaluation. No findings of FDG avid disease within the abdomen or pelvis. Possible hypermetabolic left thyroid nodule. Thyroid ultrasound is suggested   for further characterization. Problem List  Principal Problem:    Liposarcoma ([address]  Resolved Problems:    * No resolved hospital problems. *        Assessment & Plan:     66-year-old male with a history of HTN and atrial fibrillation who has:    1. Dedifferentiated liposarcoma, stage III, ypT4, pN0  - s/p left pneumonectomy on 2/28/2022 by Dr. Shweta Bustos at [address] [place] [address],[address]  - s/p neoadjuvant radiation to the left chest wall and intrathoracic mass, 5000 cGy in 25 fractions completed under the supervision of Dr. Andreia Bender at [facility] on 12/28/2021  - Will start adjuvant chemotherapy with AIM for a total of 6 cycles inpatient  - First cycle to begin today, port placed this morning in IR    2. Postoperative pain  - prn Tramadol and Gabapentin    3. Hypertension  - continue metoprolol    4. DVT prophylaxis  - Lovenox      Admit as inpatient. I anticipate hospitalization spanning at least two midnights for investigation and treatment of the above medically necessary diagnoses. LILIA Quick [facility]  [facility], [address] [facility]  [phone]    Patient was seen and examined. Agree with above. Patient is here for chemotherapy with AIM which will be started tonight.       Trena Serna MD

## 2022-05-02 NOTE — PROGRESS NOTES
Consent for chemo signed and placed on chart. Treatment, medications, and side effects verbally discussed with pt and chemocare notes provided.  Electronically signed by Laura Rangel RN on 5/2/2022 at 2:26 PM

## 2022-05-02 NOTE — PROGRESS NOTES
Pt arrived from radiology in stable condition. VSS. Pt denies any pain or nausea. Port site clean dry and intact. Will continue to monitor. Call light in reach. Wife at bedside. Will call admitting physician for orders. Awaiting bed placement.

## 2022-05-02 NOTE — BRIEF OP NOTE
Patient: Mark Cotter MRN: [id number]     YOB: 1965  Age: 60 y.o. Sex: male        DATE OF PROCEDURE: 5/2/2022     PROCEDURE PERFORMED: mediport    SURGEON: Nisa Degroot MD, MD    ANESTHESIA:  Moderate Sedation, Versed and Fentanyl    Estimated Blood Loss: None    Complications: None. Device implanted: None.            Specimen: none    Electronically signed by Nisa Degroot MD on 5/2/2022 at 10:28 AM

## 2022-05-03 ENCOUNTER — APPOINTMENT (OUTPATIENT)
Dept: GENERAL RADIOLOGY | Age: 57
DRG: 829 | End: 2022-05-03
Attending: INTERNAL MEDICINE
Payer: COMMERCIAL

## 2022-05-03 LAB
ANION GAP SERPL CALCULATED.3IONS-SCNC: 9 MMOL/L (ref 3–16)
BASOPHILS ABSOLUTE: 0 K/UL (ref 0–0.2)
BASOPHILS RELATIVE PERCENT: 0 %
BUN BLDV-MCNC: 8 MG/DL (ref 7–20)
CALCIUM SERPL-MCNC: 9.3 MG/DL (ref 8.3–10.6)
CHLORIDE BLD-SCNC: 106 MMOL/L (ref 99–110)
CO2: 22 MMOL/L (ref 21–32)
CREAT SERPL-MCNC: <0.5 MG/DL (ref 0.9–1.3)
EOSINOPHILS ABSOLUTE: 0 K/UL (ref 0–0.6)
EOSINOPHILS RELATIVE PERCENT: 0 %
GFR AFRICAN AMERICAN: >60
GFR NON-AFRICAN AMERICAN: >60
GLUCOSE BLD-MCNC: 141 MG/DL (ref 70–99)
HCT VFR BLD CALC: 37.7 % (ref 40.5–52.5)
HEMOGLOBIN: 12.3 G/DL (ref 13.5–17.5)
LYMPHOCYTES ABSOLUTE: 0.3 K/UL (ref 1–5.1)
LYMPHOCYTES RELATIVE PERCENT: 7.3 %
MCH RBC QN AUTO: 27 PG (ref 26–34)
MCHC RBC AUTO-ENTMCNC: 32.6 G/DL (ref 31–36)
MCV RBC AUTO: 82.9 FL (ref 80–100)
MONOCYTES ABSOLUTE: 0.1 K/UL (ref 0–1.3)
MONOCYTES RELATIVE PERCENT: 3 %
NEUTROPHILS ABSOLUTE: 4.1 K/UL (ref 1.7–7.7)
NEUTROPHILS RELATIVE PERCENT: 89.7 %
PDW BLD-RTO: 17.6 % (ref 12.4–15.4)
PLATELET # BLD: 241 K/UL (ref 135–450)
PMV BLD AUTO: 8 FL (ref 5–10.5)
POTASSIUM REFLEX MAGNESIUM: 4.2 MMOL/L (ref 3.5–5.1)
RBC # BLD: 4.55 M/UL (ref 4.2–5.9)
SODIUM BLD-SCNC: 137 MMOL/L (ref 136–145)
WBC # BLD: 4.6 K/UL (ref 4–11)

## 2022-05-03 PROCEDURE — 6360000002 HC RX W HCPCS

## 2022-05-03 PROCEDURE — 2580000003 HC RX 258

## 2022-05-03 PROCEDURE — 71046 X-RAY EXAM CHEST 2 VIEWS: CPT

## 2022-05-03 PROCEDURE — C1751 CATH, INF, PER/CENT/MIDLINE: HCPCS

## 2022-05-03 PROCEDURE — 2580000003 HC RX 258: Performed by: NURSE PRACTITIONER

## 2022-05-03 PROCEDURE — 85025 COMPLETE CBC W/AUTO DIFF WBC: CPT

## 2022-05-03 PROCEDURE — 2580000003 HC RX 258: Performed by: STUDENT IN AN ORGANIZED HEALTH CARE EDUCATION/TRAINING PROGRAM

## 2022-05-03 PROCEDURE — 36415 COLL VENOUS BLD VENIPUNCTURE: CPT

## 2022-05-03 PROCEDURE — 6360000002 HC RX W HCPCS: Performed by: STUDENT IN AN ORGANIZED HEALTH CARE EDUCATION/TRAINING PROGRAM

## 2022-05-03 PROCEDURE — 6360000002 HC RX W HCPCS: Performed by: INTERNAL MEDICINE

## 2022-05-03 PROCEDURE — 05HM33Z INSERTION OF INFUSION DEVICE INTO RIGHT INTERNAL JUGULAR VEIN, PERCUTANEOUS APPROACH: ICD-10-PCS | Performed by: RADIOLOGY

## 2022-05-03 PROCEDURE — 6370000000 HC RX 637 (ALT 250 FOR IP)

## 2022-05-03 PROCEDURE — 2580000003 HC RX 258: Performed by: INTERNAL MEDICINE

## 2022-05-03 PROCEDURE — 80048 BASIC METABOLIC PNL TOTAL CA: CPT

## 2022-05-03 PROCEDURE — 1200000000 HC SEMI PRIVATE

## 2022-05-03 PROCEDURE — 36569 INSJ PICC 5 YR+ W/O IMAGING: CPT

## 2022-05-03 RX ORDER — KETOROLAC TROMETHAMINE 15 MG/ML
15 INJECTION, SOLUTION INTRAMUSCULAR; INTRAVENOUS ONCE
Status: COMPLETED | OUTPATIENT
Start: 2022-05-03 | End: 2022-05-03

## 2022-05-03 RX ORDER — SODIUM CHLORIDE 9 MG/ML
INJECTION, SOLUTION INTRAVENOUS
Status: COMPLETED
Start: 2022-05-03 | End: 2022-05-03

## 2022-05-03 RX ORDER — SODIUM CHLORIDE 9 MG/ML
25 INJECTION, SOLUTION INTRAVENOUS PRN
Status: DISCONTINUED | OUTPATIENT
Start: 2022-05-03 | End: 2022-05-07 | Stop reason: HOSPADM

## 2022-05-03 RX ORDER — LIDOCAINE HYDROCHLORIDE 10 MG/ML
5 INJECTION, SOLUTION EPIDURAL; INFILTRATION; INTRACAUDAL; PERINEURAL ONCE
Status: DISCONTINUED | OUTPATIENT
Start: 2022-05-03 | End: 2022-05-07 | Stop reason: HOSPADM

## 2022-05-03 RX ORDER — SODIUM CHLORIDE 0.9 % (FLUSH) 0.9 %
5-40 SYRINGE (ML) INJECTION PRN
Status: DISCONTINUED | OUTPATIENT
Start: 2022-05-03 | End: 2022-05-07 | Stop reason: HOSPADM

## 2022-05-03 RX ORDER — SODIUM CHLORIDE 0.9 % (FLUSH) 0.9 %
5-40 SYRINGE (ML) INJECTION EVERY 12 HOURS SCHEDULED
Status: DISCONTINUED | OUTPATIENT
Start: 2022-05-03 | End: 2022-05-07 | Stop reason: HOSPADM

## 2022-05-03 RX ADMIN — Medication 10 ML: at 08:44

## 2022-05-03 RX ADMIN — PALONOSETRON HYDROCHLORIDE: 0.25 INJECTION, SOLUTION INTRAVENOUS at 11:46

## 2022-05-03 RX ADMIN — Medication 10 ML: at 14:15

## 2022-05-03 RX ADMIN — MESNA 1105 MG: 100 INJECTION, SOLUTION INTRAVENOUS at 13:40

## 2022-05-03 RX ADMIN — Medication 10 ML: at 11:45

## 2022-05-03 RX ADMIN — MESNA 5550 MG: 100 INJECTION, SOLUTION INTRAVENOUS at 14:14

## 2022-05-03 RX ADMIN — GABAPENTIN 100 MG: 100 CAPSULE ORAL at 08:43

## 2022-05-03 RX ADMIN — SODIUM CHLORIDE, PRESERVATIVE FREE 10 ML: 5 INJECTION INTRAVENOUS at 13:35

## 2022-05-03 RX ADMIN — TRAMADOL HYDROCHLORIDE 50 MG: 50 TABLET, COATED ORAL at 20:36

## 2022-05-03 RX ADMIN — SODIUM CHLORIDE 100 ML: 9 INJECTION, SOLUTION INTRAVENOUS at 11:44

## 2022-05-03 RX ADMIN — Medication 10 ML: at 22:46

## 2022-05-03 RX ADMIN — Medication 10 ML: at 16:50

## 2022-05-03 RX ADMIN — METOPROLOL TARTRATE 12.5 MG: 25 TABLET, FILM COATED ORAL at 20:36

## 2022-05-03 RX ADMIN — SODIUM CHLORIDE: 9 INJECTION, SOLUTION INTRAVENOUS at 08:43

## 2022-05-03 RX ADMIN — FOSAPREPITANT 150 MG: 150 INJECTION, POWDER, LYOPHILIZED, FOR SOLUTION INTRAVENOUS at 12:17

## 2022-05-03 RX ADMIN — MESNA 1105 MG: 100 INJECTION, SOLUTION INTRAVENOUS at 20:52

## 2022-05-03 RX ADMIN — SODIUM CHLORIDE, PRESERVATIVE FREE 10 ML: 5 INJECTION INTRAVENOUS at 12:56

## 2022-05-03 RX ADMIN — SODIUM CHLORIDE, PRESERVATIVE FREE 10 ML: 5 INJECTION INTRAVENOUS at 14:31

## 2022-05-03 RX ADMIN — MESNA 1105 MG: 100 INJECTION, SOLUTION INTRAVENOUS at 18:08

## 2022-05-03 RX ADMIN — ENOXAPARIN SODIUM 40 MG: 100 INJECTION SUBCUTANEOUS at 08:44

## 2022-05-03 RX ADMIN — SODIUM CHLORIDE, PRESERVATIVE FREE 10 ML: 5 INJECTION INTRAVENOUS at 13:21

## 2022-05-03 RX ADMIN — SODIUM CHLORIDE, PRESERVATIVE FREE 10 ML: 5 INJECTION INTRAVENOUS at 15:20

## 2022-05-03 RX ADMIN — KETOROLAC TROMETHAMINE 15 MG: 15 INJECTION, SOLUTION INTRAMUSCULAR; INTRAVENOUS at 22:46

## 2022-05-03 RX ADMIN — DOXORUBICIN HYDROCHLORIDE 166 MG: 2 INJECTION, SOLUTION INTRAVENOUS at 13:07

## 2022-05-03 RX ADMIN — Medication 10 ML: at 20:55

## 2022-05-03 RX ADMIN — POLYETHYLENE GLYCOL 3350 17 G: 17 POWDER, FOR SOLUTION ORAL at 08:54

## 2022-05-03 RX ADMIN — METOPROLOL TARTRATE 12.5 MG: 25 TABLET, FILM COATED ORAL at 08:43

## 2022-05-03 RX ADMIN — Medication 10 ML: at 18:09

## 2022-05-03 RX ADMIN — GABAPENTIN 100 MG: 100 CAPSULE ORAL at 22:00

## 2022-05-03 ASSESSMENT — PAIN DESCRIPTION - LOCATION: LOCATION: HEAD

## 2022-05-03 ASSESSMENT — PAIN SCALES - GENERAL: PAINLEVEL_OUTOF10: 5

## 2022-05-03 NOTE — FLOWSHEET NOTE
PICC placed due to new PAC site reported to be bright red, swollen and painful to touch last night. No redness noted now. PICC line education:    -Risks  -Benefits  -Alternatives  -Procedure    Discussed the above with patient, verbalized understanding, answered all questions. Provided with information on PICC care to review. PICC tip verified via 3CG (Ok to use). Reported off to patient's  Nurse Marj MUNOZ and Mel Mari RN.

## 2022-05-03 NOTE — PROGRESS NOTES
Original chemotherapy orders reviewed and acknowledged. Appropriateness of chemotherapy treatment regimen AIM for diagnosis of liposarcoma was verified. Patient educated on chemotherapy regimen. Acknowledgement of informed consent for chemotherapy verified. Pt height, weight, and BSA verified. Appropriate dosing calculations of chemotherapy based on height, weight, and BSA verified. Administration: Chemotherapy drug adriamycin independently verified with TAMMY Gonzalez prior to administration. Original order, appropriateness of regimen, drug supplied, height, weight, BSA, dose calculations, expiration dates/times, drug appearance, and two patient identifiers were verified by both RNs. Drug checked for vesicant/irritant status and for risk of hypersensitivity. Most recent laboratory values and allergies, were reviewed. Appropriate IV access available: Positive, brisk blood return via CVC was confirmed prior to administration. Chest x-ray for correct line placement reviewed  Azucena Donato RN and Danielle Richardson RN verified correct rate of chemotherapy and maintenance IV fluids. Patient was educated on chemotherapy regimen prior to administration including indication for treatment related to disease & side effects of chemotherapy drug. Patient verbalizes understanding of all instructions.

## 2022-05-03 NOTE — PROGRESS NOTES
Original chemotherapy orders reviewed and acknowledged. Appropriateness of chemotherapy treatment regimen AIM for diagnosis of liposarcoma was verified. Patient educated on chemotherapy regimen. Acknowledgement of informed consent for chemotherapy verified. Pt height, weight, and BSA verified. Appropriate dosing calculations of chemotherapy based on height, weight, and BSA verified. Administration: Chemotherapy drug Ifosfamide independently verified with Yareli Kenny RN prior to administration. Original order, appropriateness of regimen, drug supplied, height, weight, BSA, dose calculations, expiration dates/times, drug appearance, and two patient identifiers were verified by both RNs. Drug checked for vesicant/irritant status and for risk of hypersensitivity. Most recent laboratory values and allergies, were reviewed. Appropriate IV access available: Positive, brisk blood return via CVC was confirmed prior to administration. Chest x-ray for correct line placement reviewed  Edna Csah RN and Brittney Lee RN verified correct rate of chemotherapy and maintenance IV fluids. Patient was educated on chemotherapy regimen prior to administration including indication for treatment related to disease & side effects of chemotherapy drug. Patient verbalizes understanding of all instructions.

## 2022-05-03 NOTE — PROGRESS NOTES
IV fluids started at 1542. Brisk blood return to port noted. IV fluids infusing without difficulty. Chemotherapy premed aloxi started at 1618. Brisk blood return noted to port prior to infusion. Prior to starting adriamycin infusion, port flushed and brisk blood return was noted. However, pt flinched with pain when the tubing was laid on the patient's chest.  IV line flushed with 10 cc and brisk blood return was noted again. Pt denied discomfort with flushing. Upon inspection, port insertion site was bright red and painful to touch. IV dressing removed and swelling noted. IV line flushed again and brisk blood return was noted. Pt continued to deny pain with flushing. D/t swelling, redness, and pain to touch, port deaccessed. Dr Twyla Kc notified.   Adriamycin must be given through a central line; therefore, medication held at this time until site is assessed by MD in AM.

## 2022-05-03 NOTE — PROGRESS NOTES
Brisk blood return noted to PICC. Line flushed with 10cc Normal Saline. Chemo infusing without difficulty. Pt resting in bed. Denies further needs at this time. Call light in hand. Will continue to monitor.  Electronically signed by Georgi Morgan RN on 5/3/2022 at 3:24 PM

## 2022-05-03 NOTE — PROGRESS NOTES
Chart reviewed post port placement 5/2. Noted swelling around site. Oncology ordered PICC placement for treatment at this time. Dr. Servando Negrete aware and asked to check site.

## 2022-05-03 NOTE — PROGRESS NOTES
[facility]   Progress Note      5/3/2022 8:04 AM        Name: Whitley Washington .               Admitted: 5/2/2022    SUBJECTIVE:  He is doing OK, port site is without redness this morning, tender to touch, mild swelling over and around port site     Reviewed interval ancillary notes    Current Medications  0.9 % sodium chloride infusion, PRN  acetaminophen (TYLENOL) tablet 650 mg, Q6H PRN   Or  acetaminophen (TYLENOL) suppository 650 mg, Q6H PRN  aluminum & magnesium hydroxide-simethicone (MAALOX) 200-200-20 MG/5ML suspension 30 mL, Q6H PRN  enoxaparin (LOVENOX) injection 40 mg, Daily  magnesium sulfate 2000 mg in 50 mL IVPB premix, PRN  ondansetron (ZOFRAN-ODT) disintegrating tablet 4 mg, Q8H PRN   Or  ondansetron (ZOFRAN) injection 4 mg, Q6H PRN  polyethylene glycol (GLYCOLAX) packet 17 g, Daily PRN  potassium chloride 10 mEq/100 mL IVPB (Peripheral Line), PRN  sodium chloride flush 0.9 % injection 5-40 mL, 2 times per day  sodium chloride flush 0.9 % injection 5-40 mL, PRN  [START ON 5/6/2022] pegfilgrastim (NEULASTA) injection 6 mg, Once  0.9 % sodium chloride infusion, Continuous  metoprolol tartrate (LOPRESSOR) tablet 12.5 mg, BID  traMADol (ULTRAM) tablet 50 mg, Q6H PRN  gabapentin (NEURONTIN) capsule 100 mg, TID PRN  DOXOrubicin HCl (ADRIAMYCIN) chemo syringe 166 mg, Once  ifosfamide (IFEX) 5,550 mg, mesna (MESNEX) 3,315 mg in sodium chloride 0.9 % 1,000 mL chemo infusion, Daily  mesna (MESNEX) 1,105 mg in sodium chloride 0.9 % 100 mL IVPB, Daily  mesna (MESNEX) 1,105 mg in sodium chloride 0.9 % 100 mL IVPB, Daily  mesna (MESNEX) 1,105 mg in sodium chloride 0.9 % 100 mL IVPB, Daily  [START ON 5/4/2022] dexamethasone (DECADRON) 10 mg in sodium chloride 0.9 % IVPB, Daily  fosaprepitant (EMEND) 150 mg in sodium chloride 0.9 % 150 mL IVPB, Once  palonosetron (ALOXI) 0.25 mg, dexamethasone (DECADRON) 10 mg in sodium chloride 0.9 % 50 mL IVPB, Once        Objective:  /87   Pulse 74   Temp 97.4 °F (36.3 °C) (Axillary)   Resp 16   Ht 6' 3\" (1.905 m)   Wt 212 lb (96.2 kg)   SpO2 96%   BMI 26.50 kg/m²   No intake or output data in the 24 hours ending 05/03/22 0804   Wt Readings from Last 3 Encounters:   05/03/22 212 lb (96.2 kg)   05/02/22 203 lb (92.1 kg)   03/23/22 204 lb 9.6 oz (92.8 kg)       General appearance:  Appears comfortable  Eyes: Sclera clear. Pupils equal.  ENT: Moist oral mucosa. Trachea midline, no adenopathy. Cardiovascular: Regular rhythm, normal S1, S2. No murmur. No edema in lower extremities  Respiratory: Not using accessory muscles. Good inspiratory effort. Clear to auscultation bilaterally, no wheeze or crackles. GI: Abdomen soft, no tenderness, not distended  Musculoskeletal: No cyanosis in digits, neck supple  Neurology: CN 2-12 grossly intact. No speech or motor deficits  Psych: Normal affect. Alert and oriented in time, place and person  Skin: Warm, dry, normal turgor    Labs and Tests:  CBC:   Recent Labs     05/02/22  0838 05/02/22  1445 05/03/22  0633   WBC 4.3 3.7* 4.6   HGB 12.9* 12.0* 12.3*    225 241     BMP:    Recent Labs     05/02/22  0838 05/02/22  1445 05/03/22  0633    139 137   K 4.3 3.3* 4.2    103 106   CO2 27 27 22   BUN 7 6* 8   CREATININE 0.6* 0.6* <0.5*   GLUCOSE 110* 142* 141*     Hepatic:   Recent Labs     05/02/22  1445   AST 48*   ALT 48*   BILITOT 0.4   ALKPHOS 139*       ASSESSMENT AND PLAN    Principal Problem:    Liposarcoma ([address]  Resolved Problems:    * No resolved hospital problems. *      1.  Dedifferentiated liposarcoma, stage III, ypT4, pN0  - s/p left pneumonectomy on 2/28/2022 by Dr. Juris Kocher at [address] [place] [address],[address]  - s/p neoadjuvant radiation to the left chest wall and intrathoracic mass, 5000 cGy in 25 fractions completed under the supervision of Dr. Renu Gutierrez at [facility] on 12/28/2021  - Will start adjuvant chemotherapy with AIM for a total of 6 cycles inpatient  - port placed in IR 5/2/22  - pain and swelling of port, discussed with IR, will obtain CXR, IR to evaluate further before administering Adriamycin  - will have PICC line placed for treatment today     2. Postoperative pain  - prn Tramadol and Gabapentin     3. Hypertension  - continue metoprolol     4.  DVT prophylaxis  - Sherron Hanson, [facility]  [facility]

## 2022-05-03 NOTE — PROGRESS NOTES
Adriamycin administered with no difficulty. Pt tolerated well. Denies s/s of reaction. Port flushed with 10 cc NS before, long-term through, and immediately after administration of medication. Brisk blood return noted with each flush.

## 2022-05-03 NOTE — PROGRESS NOTES
Brisk blood return noted to purple lumen of PICC. Premeds and chemotherapy regimen discussed with pt and pt's spouse. Pt verbalizes understanding. Premeds for chemotherapy started.   Electronically signed by Veronica Vega RN on 5/3/2022 at 11:49 AM

## 2022-05-04 PROCEDURE — 6360000002 HC RX W HCPCS

## 2022-05-04 PROCEDURE — 6360000002 HC RX W HCPCS: Performed by: STUDENT IN AN ORGANIZED HEALTH CARE EDUCATION/TRAINING PROGRAM

## 2022-05-04 PROCEDURE — 2580000003 HC RX 258: Performed by: STUDENT IN AN ORGANIZED HEALTH CARE EDUCATION/TRAINING PROGRAM

## 2022-05-04 PROCEDURE — 2580000003 HC RX 258: Performed by: INTERNAL MEDICINE

## 2022-05-04 PROCEDURE — 6370000000 HC RX 637 (ALT 250 FOR IP): Performed by: INTERNAL MEDICINE

## 2022-05-04 PROCEDURE — 2580000003 HC RX 258: Performed by: NURSE PRACTITIONER

## 2022-05-04 PROCEDURE — 94760 N-INVAS EAR/PLS OXIMETRY 1: CPT

## 2022-05-04 PROCEDURE — 2580000003 HC RX 258

## 2022-05-04 PROCEDURE — 6370000000 HC RX 637 (ALT 250 FOR IP)

## 2022-05-04 PROCEDURE — 1200000000 HC SEMI PRIVATE

## 2022-05-04 RX ORDER — LACTULOSE 10 G/15ML
30 SOLUTION ORAL 2 TIMES DAILY
Status: DISCONTINUED | OUTPATIENT
Start: 2022-05-04 | End: 2022-05-05

## 2022-05-04 RX ADMIN — METOPROLOL TARTRATE 12.5 MG: 25 TABLET, FILM COATED ORAL at 21:59

## 2022-05-04 RX ADMIN — Medication 10 ML: at 08:02

## 2022-05-04 RX ADMIN — METOPROLOL TARTRATE 12.5 MG: 25 TABLET, FILM COATED ORAL at 08:01

## 2022-05-04 RX ADMIN — LACTULOSE 30 G: 20 SOLUTION ORAL at 09:06

## 2022-05-04 RX ADMIN — SODIUM CHLORIDE: 9 INJECTION, SOLUTION INTRAVENOUS at 13:44

## 2022-05-04 RX ADMIN — MESNA 1105 MG: 100 INJECTION, SOLUTION INTRAVENOUS at 21:56

## 2022-05-04 RX ADMIN — LACTULOSE 30 G: 20 SOLUTION ORAL at 22:02

## 2022-05-04 RX ADMIN — MESNA 5550 MG: 100 INJECTION, SOLUTION INTRAVENOUS at 16:23

## 2022-05-04 RX ADMIN — SODIUM CHLORIDE: 9 INJECTION, SOLUTION INTRAVENOUS at 21:55

## 2022-05-04 RX ADMIN — Medication 10 ML: at 22:00

## 2022-05-04 RX ADMIN — SODIUM CHLORIDE: 9 INJECTION, SOLUTION INTRAVENOUS at 02:45

## 2022-05-04 RX ADMIN — ENOXAPARIN SODIUM 40 MG: 100 INJECTION SUBCUTANEOUS at 08:01

## 2022-05-04 RX ADMIN — Medication 10 ML: at 21:56

## 2022-05-04 RX ADMIN — ACETAMINOPHEN 650 MG: 325 TABLET ORAL at 22:01

## 2022-05-04 RX ADMIN — MESNA 1105 MG: 100 INJECTION, SOLUTION INTRAVENOUS at 15:39

## 2022-05-04 RX ADMIN — DEXAMETHASONE SODIUM PHOSPHATE 10 MG: 4 INJECTION, SOLUTION INTRAMUSCULAR; INTRAVENOUS at 12:28

## 2022-05-04 ASSESSMENT — PAIN SCALES - GENERAL
PAINLEVEL_OUTOF10: 0
PAINLEVEL_OUTOF10: 4

## 2022-05-04 ASSESSMENT — PAIN - FUNCTIONAL ASSESSMENT: PAIN_FUNCTIONAL_ASSESSMENT: ACTIVITIES ARE NOT PREVENTED

## 2022-05-04 NOTE — PROGRESS NOTES
[facility]   Progress Note      5/4/2022 8:20 AM        Name: Vaishali Contreras . Admitted: 5/2/2022    SUBJECTIVE:  He is doing OK, port site is slightly red today, less tender, PICC line in place for chemotherapy, he denies nausea or vomiting, he c/o constipation.        Reviewed interval ancillary notes    Current Medications  lactulose (CHRONULAC) 10 GM/15ML solution 30 g, BID  lidocaine PF 1 % injection 5 mL, Once  sodium chloride flush 0.9 % injection 5-40 mL, 2 times per day  sodium chloride flush 0.9 % injection 5-40 mL, PRN  0.9 % sodium chloride infusion, PRN  0.9 % sodium chloride infusion, PRN  acetaminophen (TYLENOL) tablet 650 mg, Q6H PRN   Or  acetaminophen (TYLENOL) suppository 650 mg, Q6H PRN  aluminum & magnesium hydroxide-simethicone (MAALOX) 200-200-20 MG/5ML suspension 30 mL, Q6H PRN  enoxaparin (LOVENOX) injection 40 mg, Daily  magnesium sulfate 2000 mg in 50 mL IVPB premix, PRN  ondansetron (ZOFRAN-ODT) disintegrating tablet 4 mg, Q8H PRN   Or  ondansetron (ZOFRAN) injection 4 mg, Q6H PRN  polyethylene glycol (GLYCOLAX) packet 17 g, Daily PRN  potassium chloride 10 mEq/100 mL IVPB (Peripheral Line), PRN  sodium chloride flush 0.9 % injection 5-40 mL, 2 times per day  sodium chloride flush 0.9 % injection 5-40 mL, PRN  [START ON 5/6/2022] pegfilgrastim (NEULASTA) injection 6 mg, Once  0.9 % sodium chloride infusion, Continuous  metoprolol tartrate (LOPRESSOR) tablet 12.5 mg, BID  traMADol (ULTRAM) tablet 50 mg, Q6H PRN  gabapentin (NEURONTIN) capsule 100 mg, TID PRN  ifosfamide (IFEX) 5,550 mg, mesna (MESNEX) 3,315 mg in sodium chloride 0.9 % 1,000 mL chemo infusion, Daily  mesna (MESNEX) 1,105 mg in sodium chloride 0.9 % 100 mL IVPB, Daily  mesna (MESNEX) 1,105 mg in sodium chloride 0.9 % 100 mL IVPB, Daily  mesna (MESNEX) 1,105 mg in sodium chloride 0.9 % 100 mL IVPB, Daily  dexamethasone (DECADRON) 10 mg in sodium chloride 0.9 % IVPB, Daily        Objective:  BP (!) 141/77   Pulse 64   Temp 97.6 °F (36.4 °C) (Oral)   Resp 16   Ht 6' 3\" (1.905 m)   Wt 219 lb (99.3 kg)   SpO2 96%   BMI 27.37 kg/m²   No intake or output data in the 24 hours ending 05/04/22 0820   Wt Readings from Last 3 Encounters:   05/04/22 219 lb (99.3 kg)   05/02/22 203 lb (92.1 kg)   03/23/22 204 lb 9.6 oz (92.8 kg)       General appearance:  Appears comfortable  Eyes: Sclera clear. Pupils equal.  ENT: Moist oral mucosa. Trachea midline, no adenopathy. Cardiovascular: Regular rhythm, normal S1, S2. No murmur. No edema in lower extremities  Respiratory: Not using accessory muscles. Good inspiratory effort. Clear to auscultation bilaterally, no wheeze or crackles. GI: Abdomen soft, no tenderness, not distended  Musculoskeletal: No cyanosis in digits, neck supple  Neurology: CN 2-12 grossly intact. No speech or motor deficits  Psych: Normal affect. Alert and oriented in time, place and person  Skin: Warm, dry, normal turgor    Labs and Tests:  CBC:   Recent Labs     05/02/22  0838 05/02/22  1445 05/03/22  0633   WBC 4.3 3.7* 4.6   HGB 12.9* 12.0* 12.3*    225 241     BMP:    Recent Labs     05/02/22  0838 05/02/22  1445 05/03/22  0633    139 137   K 4.3 3.3* 4.2    103 106   CO2 27 27 22   BUN 7 6* 8   CREATININE 0.6* 0.6* <0.5*   GLUCOSE 110* 142* 141*     Hepatic:   Recent Labs     05/02/22  1445   AST 48*   ALT 48*   BILITOT 0.4   ALKPHOS 139*       ASSESSMENT AND PLAN    Principal Problem:    Liposarcoma ([address]  Resolved Problems:    * No resolved hospital problems. *      1.  Dedifferentiated liposarcoma, stage III, ypT4, pN0  - s/p left pneumonectomy on 2/28/2022 by Dr. Alla Rosales at [address] [place] [address],[address]  - s/p neoadjuvant radiation to the left chest wall and intrathoracic mass, 5000 cGy in 25 fractions completed under the supervision of Dr. Emily Taylor at [facility] on 12/28/2021  - Will start adjuvant chemotherapy with AIM for a total of 6 cycles inpatient  - port placed in IR 5/2/22  - port remains slightly red and tender, does not appear infected, will continue to monitor, will allow port to heal and will plan to use port for cycle 2.  -  PICC line has been placed (5/3/22) to administer chemotherapy     2. Postoperative pain  - prn Tramadol and Gabapentin     3. Hypertension  - continue metoprolol     4. DVT prophylaxis  - Lovenox    5. Constipation  - lactulose ordered       Marlen Schumacher, [facility]  [facility]    Patient was seen and examined. Doing okay. Tolerating chemotherapy reasonably well  Has constipation. Lactulose ordered. Port site is looking erythematous but better.     Vidal Francois MD

## 2022-05-04 NOTE — PROGRESS NOTES
Pt has a PICC line that is being used to receive his chemo treatment. It is getting good blood return. The port that was placed and accessed returned good blood. However, when attempting to give his first chemo treatment, on Sunday, it caused the pt to cry out in pain, and the site was painful for quite a while. Due to this, a PICC was placed. Dr. Hennie Ganser said to continue using the PICC for pt to receive his chemo treatment through.

## 2022-05-04 NOTE — PROGRESS NOTES
Original chemotherapy orders reviewed and acknowledged. Appropriateness of chemotherapy treatment regimen Ifosfamide and Mesna for diagnosis of Liposarcoma was verified. Patient educated on chemotherapy regimen. Acknowledgement of informed consent for chemotherapy verified. Pt height, weight, and BSA verified. Appropriate dosing calculations of chemotherapy based on height, weight, and BSA verified. Administration: Chemotherapy drug Ifosfamide and Mesna independently verified with Dylan Gregory RN prior to administration. Original order, appropriateness of regimen, drug supplied, height, weight, BSA, dose calculations, expiration dates/times, drug appearance, and two patient identifiers were verified by both RNs. Drug checked for vesicant/irritant status and for risk of hypersensitivity. Most recent laboratory values and allergies, were reviewed. Appropriate IV access available: Positive, brisk blood return via CVC was confirmed prior to administration. Chest x-ray for correct line placement reviewed  Jody Muñoz RN and Dylan Gregory RN verified correct rate of chemotherapy and maintenance IV fluids. Patient was educated on chemotherapy regimen prior to administration including indication for treatment related to disease & side effects of chemotherapy drug. Patient verbalizes understanding of all instructions.

## 2022-05-05 PROCEDURE — 1200000000 HC SEMI PRIVATE

## 2022-05-05 PROCEDURE — 2580000003 HC RX 258: Performed by: STUDENT IN AN ORGANIZED HEALTH CARE EDUCATION/TRAINING PROGRAM

## 2022-05-05 PROCEDURE — 6360000002 HC RX W HCPCS

## 2022-05-05 PROCEDURE — 6370000000 HC RX 637 (ALT 250 FOR IP): Performed by: STUDENT IN AN ORGANIZED HEALTH CARE EDUCATION/TRAINING PROGRAM

## 2022-05-05 PROCEDURE — 2580000003 HC RX 258: Performed by: INTERNAL MEDICINE

## 2022-05-05 PROCEDURE — 6360000002 HC RX W HCPCS: Performed by: STUDENT IN AN ORGANIZED HEALTH CARE EDUCATION/TRAINING PROGRAM

## 2022-05-05 PROCEDURE — 94760 N-INVAS EAR/PLS OXIMETRY 1: CPT

## 2022-05-05 PROCEDURE — 6370000000 HC RX 637 (ALT 250 FOR IP)

## 2022-05-05 PROCEDURE — 2580000003 HC RX 258: Performed by: NURSE PRACTITIONER

## 2022-05-05 PROCEDURE — 2580000003 HC RX 258

## 2022-05-05 RX ORDER — ZOLPIDEM TARTRATE 5 MG/1
5 TABLET ORAL NIGHTLY PRN
Status: DISCONTINUED | OUTPATIENT
Start: 2022-05-05 | End: 2022-05-07 | Stop reason: HOSPADM

## 2022-05-05 RX ORDER — LACTULOSE 10 G/15ML
30 SOLUTION ORAL 2 TIMES DAILY PRN
Status: DISCONTINUED | OUTPATIENT
Start: 2022-05-05 | End: 2022-05-06

## 2022-05-05 RX ADMIN — SODIUM CHLORIDE: 9 INJECTION, SOLUTION INTRAVENOUS at 09:27

## 2022-05-05 RX ADMIN — METOPROLOL TARTRATE 12.5 MG: 25 TABLET, FILM COATED ORAL at 09:13

## 2022-05-05 RX ADMIN — Medication 10 ML: at 09:13

## 2022-05-05 RX ADMIN — MESNA 5550 MG: 100 INJECTION, SOLUTION INTRAVENOUS at 19:27

## 2022-05-05 RX ADMIN — METOPROLOL TARTRATE 12.5 MG: 25 TABLET, FILM COATED ORAL at 22:58

## 2022-05-05 RX ADMIN — Medication 10 ML: at 22:59

## 2022-05-05 RX ADMIN — SODIUM CHLORIDE, PRESERVATIVE FREE 10 ML: 5 INJECTION INTRAVENOUS at 20:34

## 2022-05-05 RX ADMIN — MESNA 1105 MG: 100 INJECTION, SOLUTION INTRAVENOUS at 23:04

## 2022-05-05 RX ADMIN — SODIUM CHLORIDE: 9 INJECTION, SOLUTION INTRAVENOUS at 18:37

## 2022-05-05 RX ADMIN — SODIUM CHLORIDE: 9 INJECTION, SOLUTION INTRAVENOUS at 23:02

## 2022-05-05 RX ADMIN — MESNA 1105 MG: 100 INJECTION, SOLUTION INTRAVENOUS at 18:37

## 2022-05-05 RX ADMIN — SODIUM CHLORIDE, PRESERVATIVE FREE 10 ML: 5 INJECTION INTRAVENOUS at 22:47

## 2022-05-05 RX ADMIN — ZOLPIDEM TARTRATE 5 MG: 5 TABLET ORAL at 22:58

## 2022-05-05 RX ADMIN — DEXAMETHASONE SODIUM PHOSPHATE 10 MG: 4 INJECTION, SOLUTION INTRAMUSCULAR; INTRAVENOUS at 12:20

## 2022-05-05 RX ADMIN — ENOXAPARIN SODIUM 40 MG: 100 INJECTION SUBCUTANEOUS at 09:15

## 2022-05-05 RX ADMIN — MESNA 1105 MG: 100 INJECTION, SOLUTION INTRAVENOUS at 00:00

## 2022-05-05 ASSESSMENT — PAIN SCALES - GENERAL
PAINLEVEL_OUTOF10: 0
PAINLEVEL_OUTOF10: 0

## 2022-05-05 NOTE — PLAN OF CARE
Problem: Safety - Adult  Goal: Free from fall injury  5/5/2022 0551 by Suzy Good RN  Outcome: Progressing  5/5/2022 0059 by Suzy Good RN  Outcome: Progressing     Problem: ABCDS Injury Assessment  Goal: Absence of physical injury  5/5/2022 0551 by Suzy Good RN  Outcome: Progressing  5/5/2022 0059 by Suzy Good RN  Outcome: Progressing     Problem: Pain  Goal: Verbalizes/displays adequate comfort level or baseline comfort level  5/5/2022 0551 by Suzy Good RN  Outcome: Progressing  5/5/2022 0059 by Suzy Good RN  Outcome: Progressing

## 2022-05-05 NOTE — PROGRESS NOTES
Original chemotherapy orders reviewed and acknowledged. Appropriateness of chemotherapy treatment regimen Ifosfamide for diagnosis of liposcaroma was verified. Patient educated on chemotherapy regimen. Acknowledgement of informed consent for chemotherapy verified. Pt height, weight, and BSA verified. Appropriate dosing calculations of chemotherapy based on height, weight, and BSA verified. Administration: Chemotherapy drug ifosfamide independently verified with Daniel Zafar RN prior to administration. Original order, appropriateness of regimen, drug supplied, height, weight, BSA, dose calculations, expiration dates/times, drug appearance, and two patient identifiers were verified by both RNs. Drug checked for vesicant/irritant status and for risk of hypersensitivity. Most recent laboratory values and allergies, were reviewed. Appropriate IV access available: Positive, brisk blood return via CVC was confirmed prior to administration. Chest x-ray for correct line placement reviewed  Beth Malhotra RN and Daniel Zafar RN verified correct rate of chemotherapy and maintenance IV fluids. Patient was educated on chemotherapy regimen prior to administration including indication for treatment related to disease & side effects of chemotherapy drug. Patient verbalizes understanding of all instructions.

## 2022-05-05 NOTE — PROGRESS NOTES
[facility]   Progress Note      5/5/2022 8:14 AM        Name: Nelson Loyd Admitted: 5/2/2022    SUBJECTIVE:  He is doing OK, tolerating chemotherapy without problems, constipation has resolved, he denies nausea.      Reviewed interval ancillary notes    Current Medications  lactulose (CHRONULAC) 10 GM/15ML solution 30 g, BID  [START ON 5/7/2022] pegfilgrastim (NEULASTA) injection 6 mg, Once  lidocaine PF 1 % injection 5 mL, Once  sodium chloride flush 0.9 % injection 5-40 mL, 2 times per day  sodium chloride flush 0.9 % injection 5-40 mL, PRN  0.9 % sodium chloride infusion, PRN  0.9 % sodium chloride infusion, PRN  acetaminophen (TYLENOL) tablet 650 mg, Q6H PRN   Or  acetaminophen (TYLENOL) suppository 650 mg, Q6H PRN  aluminum & magnesium hydroxide-simethicone (MAALOX) 200-200-20 MG/5ML suspension 30 mL, Q6H PRN  enoxaparin (LOVENOX) injection 40 mg, Daily  magnesium sulfate 2000 mg in 50 mL IVPB premix, PRN  ondansetron (ZOFRAN-ODT) disintegrating tablet 4 mg, Q8H PRN   Or  ondansetron (ZOFRAN) injection 4 mg, Q6H PRN  polyethylene glycol (GLYCOLAX) packet 17 g, Daily PRN  potassium chloride 10 mEq/100 mL IVPB (Peripheral Line), PRN  sodium chloride flush 0.9 % injection 5-40 mL, 2 times per day  sodium chloride flush 0.9 % injection 5-40 mL, PRN  0.9 % sodium chloride infusion, Continuous  metoprolol tartrate (LOPRESSOR) tablet 12.5 mg, BID  traMADol (ULTRAM) tablet 50 mg, Q6H PRN  gabapentin (NEURONTIN) capsule 100 mg, TID PRN  ifosfamide (IFEX) 5,550 mg, mesna (MESNEX) 3,315 mg in sodium chloride 0.9 % 1,000 mL chemo infusion, Daily  mesna (MESNEX) 1,105 mg in sodium chloride 0.9 % 100 mL IVPB, Daily  mesna (MESNEX) 1,105 mg in sodium chloride 0.9 % 100 mL IVPB, Daily  mesna (MESNEX) 1,105 mg in sodium chloride 0.9 % 100 mL IVPB, Daily  dexamethasone (DECADRON) 10 mg in sodium chloride 0.9 % IVPB, Daily        Objective:  /88   Pulse 67   Temp 97.7 °F (36.5 °C) (Oral) Resp 18   Ht 6' 3\" (1.905 m)   Wt 219 lb (99.3 kg)   SpO2 97%   BMI 27.37 kg/m²   No intake or output data in the 24 hours ending 05/05/22 0814   Wt Readings from Last 3 Encounters:   05/04/22 219 lb (99.3 kg)   05/02/22 203 lb (92.1 kg)   03/23/22 204 lb 9.6 oz (92.8 kg)       General appearance:  Appears comfortable  Eyes: Sclera clear. Pupils equal.  ENT: Moist oral mucosa. Trachea midline, no adenopathy. Cardiovascular: Regular rhythm, normal S1, S2. No murmur. No edema in lower extremities  Respiratory: Not using accessory muscles. Good inspiratory effort. Clear to auscultation bilaterally, no wheeze or crackles. GI: Abdomen soft, no tenderness, not distended  Musculoskeletal: No cyanosis in digits, neck supple  Neurology: CN 2-12 grossly intact. No speech or motor deficits  Psych: Normal affect. Alert and oriented in time, place and person  Skin: Warm, dry, normal turgor    Labs and Tests:  CBC:   Recent Labs     05/02/22  0838 05/02/22  1445 05/03/22  0633   WBC 4.3 3.7* 4.6   HGB 12.9* 12.0* 12.3*    225 241     BMP:    Recent Labs     05/02/22  0838 05/02/22  1445 05/03/22  0633    139 137   K 4.3 3.3* 4.2    103 106   CO2 27 27 22   BUN 7 6* 8   CREATININE 0.6* 0.6* <0.5*   GLUCOSE 110* 142* 141*     Hepatic:   Recent Labs     05/02/22  1445   AST 48*   ALT 48*   BILITOT 0.4   ALKPHOS 139*       ASSESSMENT AND PLAN    Principal Problem:    Liposarcoma ([facility] [address]  Resolved Problems:    * No resolved hospital problems. *      1.  Dedifferentiated liposarcoma, stage III, ypT4, pN0  - s/p left pneumonectomy on 2/28/2022 by Dr. Catrachita Carey at [address] [place] [address],[address]  - s/p neoadjuvant radiation to the left chest wall and intrathoracic mass, 5000 cGy in 25 fractions completed under the supervision of Dr. Sharon Summers at [facility] on 12/28/2021  - Will start adjuvant chemotherapy with AIM for a total of 6 cycles inpatient  - port placed in IR 5/2/22  - port remains slightly red and tender, does not appear infected, will continue to monitor, will allow port to heal and will plan to use port for cycle 2.  -  PICC line has been placed (5/3/22) to administer chemotherapy   - chemotherapy continues, day 3 today, he appears to be tolerating     2. Postoperative pain  - prn Tramadol and Gabapentin     3. Hypertension  - continue metoprolol     4. DVT prophylaxis  - Lovenox    5.  Constipation  - lactulose ordered PRN      Marlen Schumacher, [facility]  [facility]

## 2022-05-06 LAB
ANION GAP SERPL CALCULATED.3IONS-SCNC: 7 MMOL/L (ref 3–16)
BUN BLDV-MCNC: 10 MG/DL (ref 7–20)
CALCIUM SERPL-MCNC: 8.7 MG/DL (ref 8.3–10.6)
CHLORIDE BLD-SCNC: 107 MMOL/L (ref 99–110)
CO2: 26 MMOL/L (ref 21–32)
CREAT SERPL-MCNC: <0.5 MG/DL (ref 0.9–1.3)
GFR AFRICAN AMERICAN: >60
GFR NON-AFRICAN AMERICAN: >60
GLUCOSE BLD-MCNC: 80 MG/DL (ref 70–99)
HCT VFR BLD CALC: 33.4 % (ref 40.5–52.5)
HEMOGLOBIN: 10.9 G/DL (ref 13.5–17.5)
MAGNESIUM: 2.1 MG/DL (ref 1.8–2.4)
MCH RBC QN AUTO: 26.9 PG (ref 26–34)
MCHC RBC AUTO-ENTMCNC: 32.6 G/DL (ref 31–36)
MCV RBC AUTO: 82.5 FL (ref 80–100)
PDW BLD-RTO: 17.4 % (ref 12.4–15.4)
PLATELET # BLD: 169 K/UL (ref 135–450)
PMV BLD AUTO: 7.7 FL (ref 5–10.5)
POTASSIUM SERPL-SCNC: 3.9 MMOL/L (ref 3.5–5.1)
RBC # BLD: 4.05 M/UL (ref 4.2–5.9)
SODIUM BLD-SCNC: 140 MMOL/L (ref 136–145)
WBC # BLD: 3.9 K/UL (ref 4–11)

## 2022-05-06 PROCEDURE — 2580000003 HC RX 258

## 2022-05-06 PROCEDURE — 6370000000 HC RX 637 (ALT 250 FOR IP)

## 2022-05-06 PROCEDURE — 2580000003 HC RX 258: Performed by: NURSE PRACTITIONER

## 2022-05-06 PROCEDURE — 6360000002 HC RX W HCPCS

## 2022-05-06 PROCEDURE — 6360000002 HC RX W HCPCS: Performed by: STUDENT IN AN ORGANIZED HEALTH CARE EDUCATION/TRAINING PROGRAM

## 2022-05-06 PROCEDURE — 80048 BASIC METABOLIC PNL TOTAL CA: CPT

## 2022-05-06 PROCEDURE — 2580000003 HC RX 258: Performed by: STUDENT IN AN ORGANIZED HEALTH CARE EDUCATION/TRAINING PROGRAM

## 2022-05-06 PROCEDURE — 6370000000 HC RX 637 (ALT 250 FOR IP): Performed by: STUDENT IN AN ORGANIZED HEALTH CARE EDUCATION/TRAINING PROGRAM

## 2022-05-06 PROCEDURE — 1200000000 HC SEMI PRIVATE

## 2022-05-06 PROCEDURE — 2580000003 HC RX 258: Performed by: INTERNAL MEDICINE

## 2022-05-06 PROCEDURE — 85027 COMPLETE CBC AUTOMATED: CPT

## 2022-05-06 PROCEDURE — 83735 ASSAY OF MAGNESIUM: CPT

## 2022-05-06 PROCEDURE — 6370000000 HC RX 637 (ALT 250 FOR IP): Performed by: INTERNAL MEDICINE

## 2022-05-06 RX ORDER — LACTULOSE 10 G/15ML
30 SOLUTION ORAL 3 TIMES DAILY
Status: DISCONTINUED | OUTPATIENT
Start: 2022-05-06 | End: 2022-05-07 | Stop reason: HOSPADM

## 2022-05-06 RX ADMIN — SODIUM CHLORIDE: 9 INJECTION, SOLUTION INTRAVENOUS at 11:40

## 2022-05-06 RX ADMIN — LACTULOSE 30 G: 20 SOLUTION ORAL at 14:29

## 2022-05-06 RX ADMIN — MESNA 5550 MG: 100 INJECTION, SOLUTION INTRAVENOUS at 14:54

## 2022-05-06 RX ADMIN — SODIUM CHLORIDE, PRESERVATIVE FREE 10 ML: 5 INJECTION INTRAVENOUS at 20:34

## 2022-05-06 RX ADMIN — Medication 10 ML: at 09:25

## 2022-05-06 RX ADMIN — Medication 10 ML: at 09:24

## 2022-05-06 RX ADMIN — Medication 5 ML: at 20:30

## 2022-05-06 RX ADMIN — MESNA 1105 MG: 100 INJECTION, SOLUTION INTRAVENOUS at 20:42

## 2022-05-06 RX ADMIN — MESNA 1105 MG: 100 INJECTION, SOLUTION INTRAVENOUS at 05:00

## 2022-05-06 RX ADMIN — ZOLPIDEM TARTRATE 5 MG: 5 TABLET ORAL at 23:17

## 2022-05-06 RX ADMIN — ONDANSETRON 4 MG: 2 INJECTION INTRAMUSCULAR; INTRAVENOUS at 18:34

## 2022-05-06 RX ADMIN — METOPROLOL TARTRATE 12.5 MG: 25 TABLET, FILM COATED ORAL at 20:39

## 2022-05-06 RX ADMIN — DEXAMETHASONE SODIUM PHOSPHATE 10 MG: 4 INJECTION, SOLUTION INTRAMUSCULAR; INTRAVENOUS at 11:42

## 2022-05-06 RX ADMIN — ONDANSETRON 4 MG: 2 INJECTION INTRAMUSCULAR; INTRAVENOUS at 09:24

## 2022-05-06 RX ADMIN — ENOXAPARIN SODIUM 40 MG: 100 INJECTION SUBCUTANEOUS at 09:25

## 2022-05-06 RX ADMIN — MESNA 1105 MG: 100 INJECTION, SOLUTION INTRAVENOUS at 14:28

## 2022-05-06 RX ADMIN — METOPROLOL TARTRATE 12.5 MG: 25 TABLET, FILM COATED ORAL at 09:24

## 2022-05-06 RX ADMIN — Medication 10 ML: at 20:34

## 2022-05-06 NOTE — PROGRESS NOTES
Patient's PICC flushed, brisk blood return noted. Patient did not exhibit any signs or symptoms of an adverse reaction to the chemotherapy. Vital signs taken by primary nurse and noted by myself to be stable.

## 2022-05-06 NOTE — CARE COORDINATION
Discharge Planning:     (CM) received a call from the patient's [employer] , Serge Qiu ([phone]), who reported that she can assist with the patient's discharge if help is needed. CM informed of patient's discharge today or tomorrow with no anticipated needs, at this time. Serge Qiu reported that she will be following up with the patient upon discharge. CM spoke with patient's Nurse, Najma Forman, who confirmed that the patient will be discharging tomorrow (5/7/2022) after Neulasta injection. Nurse confirmed that the patient will not be discharging home with a PICC line. All CM discharge needs met for the patient, at this time. Medical staff to inform CM Team if additional discharge needs arise.       Helena CAMPOS, SHUN, [facility] -   [phone]    Electronically signed by ANDRES Hodge on 5/6/2022 at 10:54 AM

## 2022-05-06 NOTE — PROGRESS NOTES
Pulse 64   Temp 97.9 °F (36.6 °C) (Oral)   Resp 14   Ht 6' 3\" (1.905 m)   Wt 225 lb 9.6 oz (102.3 kg)   SpO2 96%   BMI 28.20 kg/m²   No intake or output data in the 24 hours ending 05/06/22 0837   Wt Readings from Last 3 Encounters:   05/06/22 225 lb 9.6 oz (102.3 kg)   05/02/22 203 lb (92.1 kg)   03/23/22 204 lb 9.6 oz (92.8 kg)       General appearance:  Appears comfortable  Eyes: Sclera clear. Pupils equal.  ENT: Moist oral mucosa. Trachea midline, no adenopathy. Cardiovascular: Regular rhythm, normal S1, S2. No murmur. No edema in lower extremities  Respiratory: Not using accessory muscles. Good inspiratory effort. Clear to auscultation bilaterally, no wheeze or crackles. GI: Abdomen soft, no tenderness, not distended  Musculoskeletal: No cyanosis in digits, neck supple  Neurology: CN 2-12 grossly intact. No speech or motor deficits  Psych: Normal affect. Alert and oriented in time, place and person  Skin: Warm, dry, normal turgor    Labs and Tests:  CBC:   Recent Labs     05/06/22  0502   WBC 3.9*   HGB 10.9*        BMP:    Recent Labs     05/06/22  0502      K 3.9      CO2 26   BUN 10   CREATININE <0.5*   GLUCOSE 80     Hepatic:   No results for input(s): AST, ALT, ALB, BILITOT, ALKPHOS in the last 72 hours. ASSESSMENT AND PLAN    Principal Problem:    Liposarcoma ([facility] [address]  Resolved Problems:    * No resolved hospital problems. *      1.  Dedifferentiated liposarcoma, stage III, ypT4, pN0  - s/p left pneumonectomy on 2/28/2022 by Dr. Zainab Hodge at [address] [place] [address],[address]  - s/p neoadjuvant radiation to the left chest wall and intrathoracic mass, 5000 cGy in 25 fractions completed under the supervision of Dr. Alfa Beltran at [facility] on 12/28/2021  - Will start adjuvant chemotherapy with AIM for a total of 6 cycles inpatient  - port placed in IR 5/2/22  - port site is looking better, less tender, does not appear infected, will continue to monitor, will allow port to heal and will plan to use port for cycle 2.  -  PICC line has been placed (5/3/22) to administer chemotherapy   - chemotherapy continues, day 4 today, he appears to be tolerating   - he will be discharged after Neulasta injection on 5/7/22    2. Postoperative pain  - prn Tramadol and Gabapentin     3. Hypertension  - continue metoprolol     4. DVT prophylaxis  - Lovenox    5. Constipation  - lactulose ordered PRN      Devaughn Hanson CNP  [facility]    Patient was seen and examined. Discussed with RN. Tolerating chemotherapy well without any major side effects. Had mild nausea this morning. Still constipated. Will do lactulose 30 g 3 times a day scheduled.   Discharge on 5/7/2022    Safia Betts MD

## 2022-05-06 NOTE — PROGRESS NOTES
Patient's PICC flushed, brisk blood return noted. Patient does not exhibit any signs or symptoms of an adverse reaction to the chemotherapy.

## 2022-05-06 NOTE — PROGRESS NOTES
Original chemotherapy orders reviewed and acknowledged. Appropriateness of chemotherapy treatment regimen Ifosfamide and Mesna for diagnosis of Liposarcoma was verified. Patient educated on chemotherapy regimen. Acknowledgement of informed consent for chemotherapy verified. Pt height, weight, and BSA verified. Appropriate dosing calculations of chemotherapy based on height, weight, and BSA verified. Administration: Chemotherapy drug Ifosfamide and Mesna independently verified with Jennifer Aguilar RN prior to administration. Original order, appropriateness of regimen, drug supplied, height, weight, BSA, dose calculations, expiration dates/times, drug appearance, and two patient identifiers were verified by both RNs. Drug checked for vesicant/irritant status and for risk of hypersensitivity. Most recent laboratory values and allergies, were reviewed. Appropriate IV access available: Positive, brisk blood return via CVC was confirmed prior to administration. Chest x-ray for correct line placement reviewed  Veverly TAMMY Roque and Jennifer Aguilar RN verified correct rate of chemotherapy and maintenance IV fluids. Patient was educated on chemotherapy regimen prior to administration including indication for treatment related to disease & side effects of chemotherapy drug. Patient verbalizes understanding of all instructions.

## 2022-05-07 VITALS
HEIGHT: 75 IN | HEART RATE: 76 BPM | TEMPERATURE: 99.1 F | OXYGEN SATURATION: 96 % | RESPIRATION RATE: 16 BRPM | BODY MASS INDEX: 28.05 KG/M2 | WEIGHT: 225.6 LBS | SYSTOLIC BLOOD PRESSURE: 115 MMHG | DIASTOLIC BLOOD PRESSURE: 83 MMHG

## 2022-05-07 PROCEDURE — 6360000002 HC RX W HCPCS: Performed by: STUDENT IN AN ORGANIZED HEALTH CARE EDUCATION/TRAINING PROGRAM

## 2022-05-07 PROCEDURE — 6360000002 HC RX W HCPCS: Performed by: INTERNAL MEDICINE

## 2022-05-07 PROCEDURE — 2580000003 HC RX 258: Performed by: NURSE PRACTITIONER

## 2022-05-07 PROCEDURE — 2580000003 HC RX 258: Performed by: INTERNAL MEDICINE

## 2022-05-07 PROCEDURE — 2580000003 HC RX 258: Performed by: STUDENT IN AN ORGANIZED HEALTH CARE EDUCATION/TRAINING PROGRAM

## 2022-05-07 PROCEDURE — 6370000000 HC RX 637 (ALT 250 FOR IP)

## 2022-05-07 PROCEDURE — 6360000002 HC RX W HCPCS

## 2022-05-07 PROCEDURE — 2580000003 HC RX 258

## 2022-05-07 RX ORDER — 0.9 % SODIUM CHLORIDE 0.9 %
1000 INTRAVENOUS SOLUTION INTRAVENOUS ONCE
Status: COMPLETED | OUTPATIENT
Start: 2022-05-07 | End: 2022-05-07

## 2022-05-07 RX ADMIN — PEGFILGRASTIM 6 MG: 6 INJECTION SUBCUTANEOUS at 17:34

## 2022-05-07 RX ADMIN — MESNA 1105 MG: 100 INJECTION, SOLUTION INTRAVENOUS at 00:53

## 2022-05-07 RX ADMIN — ENOXAPARIN SODIUM 40 MG: 100 INJECTION SUBCUTANEOUS at 07:53

## 2022-05-07 RX ADMIN — SODIUM CHLORIDE: 9 INJECTION, SOLUTION INTRAVENOUS at 00:53

## 2022-05-07 RX ADMIN — ONDANSETRON 4 MG: 2 INJECTION INTRAMUSCULAR; INTRAVENOUS at 18:52

## 2022-05-07 RX ADMIN — SODIUM CHLORIDE 1000 ML: 9 INJECTION, SOLUTION INTRAVENOUS at 18:48

## 2022-05-07 RX ADMIN — Medication 10 ML: at 07:54

## 2022-05-07 RX ADMIN — SODIUM CHLORIDE: 9 INJECTION, SOLUTION INTRAVENOUS at 11:45

## 2022-05-07 RX ADMIN — METOPROLOL TARTRATE 12.5 MG: 25 TABLET, FILM COATED ORAL at 07:54

## 2022-05-07 NOTE — CARE COORDINATION
/Patient discharged 5/7/2022 to home  All discharge needs met per case management    Edelmira Hernandez RN, BSN  [phone]

## 2022-05-07 NOTE — DISCHARGE SUMMARY
Physician Discharge Summary     Patient ID:  Jenni Velasquez  [id number]  60 y.o.  1965    Admit date: 5/2/2022    Discharge date and time: No discharge date for patient encounter. Admitting Physician: Vamsi Martin MD     Discharge Physician: Genia Brandon     Admission Diagnoses: Liposarcoma [facility]) [C49.9]    Discharge Diagnoses: same/inpt chemo     Admission Condition: good    Discharged Condition: good    Indication for Admission: inpt chemo for sarcoma     Hospital Course: Chary Mccoy was admitted for inpt chemo for liposarcoma with aim therapy   He has completed his treatment and will be dc today after neulasta given   HE has no encephalopathy or hemorrhagic cystitis  Labs from yesterday are stable  NO severe vomiting   He has meds at home for vomiting and diarrhea   Port needs to be looked at in the office -may need revision-apparently was not infusing well and needed a picc placed   Consults: port placement     Significant Diagnostic Studies:     Outstanding Order Results     No orders found from 4/3/2022 to 5/3/2022.           Treatments: aim chemo   Port placement     Discharge Exam:  /84   Pulse 64   Temp 97.7 °F (36.5 °C) (Oral)   Resp 16   Ht 6' 3\" (1.905 m)   Wt 225 lb 9.6 oz (102.3 kg)   SpO2 95%   BMI 28.20 kg/m²     General Appearance:    Alert, cooperative, no distress, appears stated age   Head:    Normocephalic, without obvious abnormality, atraumatic   Eyes:    PERRL, conjunctiva/corneas clear, EOM's intact,    benign, both eyes            Nose:   Nares normal, septum midline, mucosa normal, no drainage    or sinus tenderness   Throat:   Lips, mucosa, and tongue normal; teeth and gums normal   Neck:   Supple, symmetrical, trachea midline, no adenopathy;        thyroid:  No enlargement/tenderness/nodules; no carotid    bruit or JVD   Back:     Symmetric, no curvature, ROM normal, no CVA tenderness   Lungs:     Clear to auscultation bilaterally, respirations unlabored   Chest wall:    No tenderness or deformity scar present on chest    Heart:    Regular rate and rhythm, S1 and S2 normal, no murmur, rub   or gallop   Abdomen:     Soft, non-tender, bowel sounds active all four quadrants,     no masses, no organomegaly   \        Extremities:   Extremities normal, atraumatic, no cyanosis or edema   Pulses:   2+ and symmetric all extremities   Skin:   Skin color, texture, turgor normal, no rashes or lesions   Lymph nodes:   Cervical, supraclavicular, and axillary nodes normal   Neurologic:   CNII-XII intact.  Normal strength, sensation and reflexes       throughout       Disposition: home    Patient Instructions:   [unfilled]  Activity: activity as tolerated  Diet: regular diet  Wound Care:watch port site for redness /drainage-may need his port worked on in the offic e  Follow-up with dr Ashley Schneider this coming week     Signed:  Veronica Miller MD  5/7/2022  11:53 AM

## 2022-05-07 NOTE — PROGRESS NOTES
Pt remains to have positive orthostatic hypotension and refusing to drink due to nausea. Bolus started as ordered, given PRN zofran and provided with water to drink. Will continue to monitor.

## 2022-05-07 NOTE — PLAN OF CARE
Problem: Safety - Adult  Goal: Free from fall injury  5/7/2022 0918 by Olga Dubois RN  Outcome: Completed  5/7/2022 0505 by Rodrigo Mary RN  Outcome: Progressing     Problem: ABCDS Injury Assessment  Goal: Absence of physical injury  5/7/2022 0918 by Olga Dubois RN  Outcome: Completed  5/7/2022 0505 by Rodrigo Mary RN  Outcome: Progressing     Problem: Pain  Goal: Verbalizes/displays adequate comfort level or baseline comfort level  5/7/2022 0918 by Olga Dubois RN  Outcome: Completed  5/7/2022 0505 by Rodrigo Mary RN  Outcome: Progressing

## 2022-05-07 NOTE — PROGRESS NOTES
Pt up to restroom complaints of being dizzy and light headed. Positive orthostatic hypotension. MD paged. IV has already been removed. MD order to re assess in 30 min. If not better insert IV and give bolus.

## 2022-06-20 ENCOUNTER — NURSE TRIAGE (OUTPATIENT)
Dept: OTHER | Facility: CLINIC | Age: 57
End: 2022-06-20

## 2022-06-20 NOTE — TELEPHONE ENCOUNTER
Received call from Raulito at [facility] with Red Flag Complaint. Subjective: Caller states \"shoulder pain where left shoulder blade is and underneath armpit that started last week, I think I pulled something picking up a box because I felt it pull, I've had pain in my left shoulder since my surgery due to scar tissue build up but now it's a little worse\"     Current Symptoms: left shoulder pain, worse with movement or trying to lift things    Denies chest pain, sob, denies swelling/discoloration    Onset: 7 weeks ago; sudden    Associated Symptoms: NA    Pain Severity: 5/10; aching; constant    Temperature: Denies NA    What has been tried: ibuprofen, helps moderately, heating helps    LMP: NA Pregnant: NA    Recommended disposition: See in Office Today or Tomorrow    Care advice provided, patient verbalizes understanding; denies any other questions or concerns; instructed to call back for any new or worsening symptoms. Patient/Caller agrees with recommended disposition; writer provided warm transfer to Chris Peck at [facility] for appointment scheduling     Attention Provider: Thank you for allowing me to participate in the care of your patient. The patient was connected to triage in response to information provided to the ECC/PSC. Please do not respond through this encounter as the response is not directed to a shared pool.         Reason for Disposition   High-risk adult (e.g., age > 61 years, osteoporosis, chronic steroid use)    Protocols used: SHOULDER INJURY-ADULT-[place]

## 2022-06-21 ENCOUNTER — OFFICE VISIT (OUTPATIENT)
Dept: FAMILY MEDICINE CLINIC | Age: 57
End: 2022-06-21
Payer: COMMERCIAL

## 2022-06-21 VITALS
HEART RATE: 75 BPM | DIASTOLIC BLOOD PRESSURE: 78 MMHG | TEMPERATURE: 97.9 F | WEIGHT: 212.38 LBS | BODY MASS INDEX: 26.55 KG/M2 | OXYGEN SATURATION: 97 % | SYSTOLIC BLOOD PRESSURE: 124 MMHG | RESPIRATION RATE: 12 BRPM

## 2022-06-21 DIAGNOSIS — M54.14 THORACIC NEURITIS: ICD-10-CM

## 2022-06-21 DIAGNOSIS — C49.9 LIPOSARCOMA (HCC): ICD-10-CM

## 2022-06-21 DIAGNOSIS — S46.912A SHOULDER STRAIN, LEFT, INITIAL ENCOUNTER: Primary | ICD-10-CM

## 2022-06-21 PROCEDURE — 99214 OFFICE O/P EST MOD 30 MIN: CPT | Performed by: FAMILY MEDICINE

## 2022-06-21 RX ORDER — IBUPROFEN 800 MG/1
800 TABLET ORAL
Qty: 30 TABLET | Refills: 1 | Status: SHIPPED | OUTPATIENT
Start: 2022-06-21

## 2022-06-21 RX ORDER — GABAPENTIN 300 MG/1
300 CAPSULE ORAL 2 TIMES DAILY
Qty: 60 CAPSULE | Refills: 2 | Status: SHIPPED | OUTPATIENT
Start: 2022-06-21 | End: 2022-10-18 | Stop reason: SDUPTHER

## 2022-06-21 NOTE — PATIENT INSTRUCTIONS
Patient Education        Learning About Peripheral Neuropathy  What is peripheral neuropathy? Peripheral neuropathy is a problem that affects the peripheral nerves. These nerves lead from the spinal cord to other parts of the body. They control yoursense of touch, how you feel pain and temperature, and your muscle strength. Most of the time the problem starts in the fingers and toes. As it gets worse, it moves into the limbs. It can cause pain and loss of feeling in the feet,legs, and hands. What causes it? There are several causes:   Diabetes. This is the most common cause. If your blood sugar is too high for too long, it can damage the nerves.  Kidney problems. These can lead to toxic substances in the blood that damage nerves.  Low levels of thyroid hormone (hypothyroidism). This may cause swelling of the tissues around the nerves, which can put pressure on them.  Infectious or inflammatory diseases. Examples are HIV and Guillain-Barré syndrome. These diseases can damage the nerves.  Peripheral nerve injury. A physical injury can damage the nerves. Injuries can be from things like falls, car crashes, or playing sports.  Overusing alcohol and not eating a healthy diet. These can lead to your body not having enough of certain vitamins, such as vitamin B-12. This can damage nerves.  Being exposed to toxic substances. These include lead, mercury, and certain medicines, such as those used for chemotherapy. Sometimes the cause isn't known. What are the symptoms? Symptoms of peripheral neuropathy can occur slowly over time. The most commonones are:   Numbness, tightness, and tingling, especially in the legs, hands, and feet.  Loss of feeling.  Burning, shooting, or stabbing pain in the legs, hands, and feet. Often the pain is worse at night.  Weakness and loss of balance. What can happen if you have it?   If peripheral neuropathy gets worse, it can lead to a complete lack of feeling in prevent problems. Some people find that physical therapy, acupuncture, or transcutaneouselectrical nerve stimulation (TENS) helps relieve pain. Follow-up care is a key part of your treatment and safety. Be sure to make and go to all appointments, and call your doctor if you are having problems. It's also a good idea to know your test results and keep alist of the medicines you take. Where can you learn more? Go to https://[email or web address] org and sign in to your [employer] account. Enter P130 in the [employer] box to learn more about \"Learning About Peripheral Neuropathy. \"     If you do not have an account, please click on the \"Sign Up Now\" link. Current as of: July 28, 2021               Content Version: 13.3  © 2006-2022 Healthwise, Incorporated. Care instructions adapted under license by [facility] ([facility]). If you have questions about a medical condition or this instruction, always ask your healthcare professional. [place] [address] any warranty or liability for your use of this information.

## 2022-06-21 NOTE — PROGRESS NOTES
Subjective:      Patient ID: Jenni Velasquez is a 60 y.o. male. CC: Patient presents for acute medical problem-left shoulder pain and chest pain. Medical assistant notes reviewed. Shoulder Pain   The pain is present in the left shoulder. This is a new problem. Episode onset:  a week  The problem occurs constantly. The problem has been unchanged. The quality of the pain is described as sharp and pounding (shooting ). The pain is at a severity of 6/10. The pain is moderate. Associated symptoms include an inability to bear weight, a limited range of motion and stiffness. The symptoms are aggravated by lying down and activity. He has tried heat and NSAIDS for the symptoms. The treatment provided moderate relief. 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 He lifted a box last week and immediately had discomfort in his shoulder. He has been taking some ibuprofen 400 mg 3 times a day with some improvement. He has prescriptions for gabapentin but he stopped taking medication as the 100 mg strength was not working for him. He does have tramadol but does like the pain medication. He has difficulty sleeping at nighttime. Patient also had prior radiation therapy to this site. Review of Systems   Musculoskeletal: Positive for stiffness. No Known Allergies  Objective:   Physical Exam  Vitals and nursing note reviewed. Constitutional:       General: He is not in acute distress. Appearance: He is well-developed. He is ill-appearing. Pulmonary:      Effort: Pulmonary effort is normal.      Breath sounds: Normal breath sounds. Chest:      Chest wall: Tenderness (Tenderness started in the intrascapular area on the left side extends around to the sternal area.) present. Musculoskeletal:      Right shoulder: Crepitus present. No tenderness.  Decreased range of motion. Left shoulder: Tenderness present. Decreased range of motion (Very limited range of motion secondary to discomfort). Skin:     General: Skin is warm. Findings: No rash. Neurological:      Mental Status: He is alert. Sensory: Sensation is intact. Motor: Motor function is intact. Psychiatric:         Behavior: Behavior is cooperative. Assessment:      Zoë Singleton was seen today for shoulder pain. Diagnoses and all orders for this visit:    Shoulder strain, left, initial encounter    Thoracic neuritis    Liposarcoma ([address]    Other orders  -     ibuprofen (ADVIL;MOTRIN) 800 MG tablet; Take 1 tablet by mouth 3 times daily (with meals)  -     gabapentin (NEURONTIN) 300 MG capsule; Take 1 capsule by mouth in the morning and at bedtime for 90 days. Plan:      Past surgical information and pathology reviewed with patient and wife    Symptoms are more consistent with thoracic neuritis from the cancer I recommended he start gabapentin regularly twice daily and report back in the next 2 weeks. If his symptoms are not improving the gabapentin dosage should be increased. Patient feels comfortable with this. He still may take some Tylenol for discomfort. Since the ibuprofen has helped some of his shoulder pain recommend he go to a prescription strength ibuprofen for the next 7 to 10 days. RTC in 1 month with Dr. Gunnar Baugh decision making of moderate complexity. Please note that this chart was generated using Dragon dictation software. Although every effort was made to ensure the accuracy of this automated transcription, some errors in transcription may have occurred.

## 2022-07-22 ENCOUNTER — OFFICE VISIT (OUTPATIENT)
Dept: FAMILY MEDICINE CLINIC | Age: 57
End: 2022-07-22
Payer: COMMERCIAL

## 2022-07-22 VITALS
TEMPERATURE: 97.7 F | WEIGHT: 213 LBS | BODY MASS INDEX: 26.62 KG/M2 | DIASTOLIC BLOOD PRESSURE: 90 MMHG | SYSTOLIC BLOOD PRESSURE: 118 MMHG

## 2022-07-22 DIAGNOSIS — C49.9 LIPOSARCOMA (HCC): Primary | ICD-10-CM

## 2022-07-22 DIAGNOSIS — G89.29 CHRONIC LEFT SHOULDER PAIN: ICD-10-CM

## 2022-07-22 DIAGNOSIS — M25.512 CHRONIC LEFT SHOULDER PAIN: ICD-10-CM

## 2022-07-22 PROCEDURE — 99214 OFFICE O/P EST MOD 30 MIN: CPT | Performed by: FAMILY MEDICINE

## 2022-07-22 ASSESSMENT — ENCOUNTER SYMPTOMS
DIARRHEA: 0
RHINORRHEA: 0
CONSTIPATION: 0

## 2022-07-22 NOTE — PROGRESS NOTES
SUBJECTIVE:    Amparo Ramírez is a 60 y.o. male who presents for a follow up visit. Chief Complaint   Patient presents with    Follow-up     Patient is here for a follow up for left shoulder pain, has improved, but still does not have much strength or ROM . Shoulder Pain   The pain is present in the left shoulder. This is a chronic problem. The current episode started more than 1 month ago. The problem has been gradually improving. The quality of the pain is described as aching and sharp. The pain is moderate. He has tried rest (Outpatient PT) for the symptoms. The treatment provided mild relief. Lung Cancer  Patient presented to the office in July 2021 with complaints of cough, congestion and feeling short of breath. He was sent for chest x-ray and found to have a large mass on the left. CT was then done showing a large pleural-based left lung mass within the major fissure and a small left pleural effusion. There is also left hilar adenopathy noted. PET scan the following month revealed partially calcified mass occupying much of the left hemithorax with mariana metastasis. From January to July he had lost about 30 pounds. He is a non-smoker and no exposure to secondhand smoke, asbestos, radiation, or chemicals. 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 Patient underwent a left pneumonectomy at the [address] [place] [address],[address] on 2/28/2022. Patient continues to have postoperative pain mainly confined to the left shoulder. He was given tramadol but is not using it. Instead he is using gabapentin 300 mg twice daily. Patient's medications, allergies, past medical,surgical, social and family histories were reviewed and updated as appropriate.      Past Medical History:   Diagnosis Date    A-fib ([facility]     Cancer ([facility]     HTN (hypertension)      Past Surgical History:   Procedure Laterality Date    CT NEEDLE BIOPSY LUNG PERCUTANEOUS  07/20/2021    CT NEEDLE BIOPSY LUNG PERCUTANEOUS 7/20/2021 [facility] CT SCAN    CYST REMOVAL      March 2022    EXCISION OF FACIAL MASS Right 03/24/2017    right cheek    IR PORT PLACEMENT EQUAL OR GREATER THAN 5 YEARS  5/2/2022    IR PORT PLACEMENT EQUAL OR GREATER THAN 5 YEARS 5/2/2022 Brandon Davis MD [facility] SPECIAL PROCEDURES    KNEE ARTHROSCOPY Right 1993    LUNG REMOVAL, TOTAL Left      Family History   Problem Relation Age of Onset    High Blood Pressure Mother     Cancer Father     Diabetes Father     High Blood Pressure Father     High Cholesterol Father     Heart Disease Father      Social History     Tobacco Use    Smoking status: Never    Smokeless tobacco: Former     Types: Chew   Substance Use Topics    Alcohol use: Yes     Alcohol/week: 12.0 standard drinks     Types: 12 Cans of beer per week      No Known Allergies  Current Outpatient Medications on File Prior to Visit   Medication Sig Dispense Refill    ibuprofen (ADVIL;MOTRIN) 800 MG tablet Take 1 tablet by mouth 3 times daily (with meals) 30 tablet 1    gabapentin (NEURONTIN) 300 MG capsule Take 1 capsule by mouth in the morning and at bedtime for 90 days. 60 capsule 2    traMADol (ULTRAM) 50 MG tablet Take 50 mg by mouth every 6 hours as needed for Pain.      metoprolol tartrate (LOPRESSOR) 25 MG tablet TAKE 1/2 TABLET BY MOUTH TWICE A DAY 30 tablet 5     No current facility-administered medications on file prior to visit. Review of Systems   HENT:  Negative for congestion, postnasal drip and rhinorrhea. Gastrointestinal:  Negative for constipation and diarrhea. Genitourinary:  Negative for difficulty urinating. Musculoskeletal:  Positive for arthralgias (left shoulder). Psychiatric/Behavioral:  Positive for dysphoric mood. Negative for sleep disturbance. The patient is nervous/anxious. OBJECTIVE:    BP (!) 118/90   Temp 97.7 °F (36.5 °C)   Wt 213 lb (96.6 kg)   BMI 26.62 kg/m²    Physical Exam  Constitutional:       Appearance: He is well-developed.    HENT:      Head: Normocephalic and atraumatic. Right Ear: External ear normal.      Left Ear: External ear normal.      Nose: Nose normal.   Eyes:      General:         Right eye: No discharge. Conjunctiva/sclera: Conjunctivae normal.   Neck:      Thyroid: No thyromegaly. Vascular: No JVD. Trachea: No tracheal deviation. Cardiovascular:      Rate and Rhythm: Normal rate and regular rhythm. Heart sounds: Normal heart sounds. Pulmonary:      Effort: Pulmonary effort is normal. No respiratory distress. Breath sounds: Examination of the left-upper field reveals decreased breath sounds. Examination of the left-middle field reveals decreased breath sounds. Examination of the left-lower field reveals decreased breath sounds. Decreased breath sounds present. No rales. Musculoskeletal:      Left shoulder: Decreased range of motion. Cervical back: Normal range of motion and neck supple. Lymphadenopathy:      Cervical: No cervical adenopathy. Skin:     General: Skin is warm and dry. Neurological:      Mental Status: He is alert and oriented to person, place, and time. ASSESSMENT/PLAN:    Adam Armstrong was seen today for follow-up. Diagnoses and all orders for this visit:    Liposarcoma ([address] of the left lung  Patient is now status post left pneumonectomy. He seems to be doing fairly well from a respiratory standpoint. He continues to have chronic pain in the left shoulder and decreased range of motion. Oncology is following and planning to do intermittent CTs. Patient did not tolerate hemotherapy. Chronic left shoulder pain  I have asked him to increase his gabapentin to 3 times daily. He will do this slowly as he is already on 300 mg in the morning and 300 mg in the evening. He will start with 100 mg in the midday and increase to 300 mg midday as well over time. We will also refer him for home PT. Return in about 6 months (around 1/22/2023).     Please note portions of this note were completed with a voicerecognition program.  Efforts were made to edit the dictations but occasionally words are mis-transcribed.

## 2022-07-26 DIAGNOSIS — S46.912A SHOULDER STRAIN, LEFT, INITIAL ENCOUNTER: ICD-10-CM

## 2022-07-26 DIAGNOSIS — C49.9 LIPOSARCOMA (HCC): ICD-10-CM

## 2022-07-26 DIAGNOSIS — M25.512 LEFT SHOULDER PAIN, UNSPECIFIED CHRONICITY: Primary | ICD-10-CM

## 2022-07-26 DIAGNOSIS — M25.512 CHRONIC LEFT SHOULDER PAIN: ICD-10-CM

## 2022-07-26 DIAGNOSIS — G89.29 CHRONIC LEFT SHOULDER PAIN: ICD-10-CM

## 2022-08-09 ENCOUNTER — TELEPHONE (OUTPATIENT)
Dept: FAMILY MEDICINE CLINIC | Age: 57
End: 2022-08-09

## 2022-08-09 DIAGNOSIS — M25.512 LEFT SHOULDER PAIN, UNSPECIFIED CHRONICITY: Primary | ICD-10-CM

## 2022-08-09 NOTE — TELEPHONE ENCOUNTER
Susan with [facility] calling to see if Dr. Tomy You is able to put a new order referral in for PT for the patient. States they have 3 days to get the patient started in their system and the referral was sent on 7/26. Patient was in chemo during that time. The new fax number is [phone]. It is only for [place] offices. Please advise.

## 2022-08-11 NOTE — TELEPHONE ENCOUNTER
Last OV: 3/23/22 RMM  Last labs: 3/23/22 EKG  Appt scheduled :  9/20/22 RMM [de-identified] : Tenderness left trapezius area. Good range of motion of shoulder. Range of motion of neck. No ecchymosis

## 2022-09-14 NOTE — PROGRESS NOTES
[facility]   Electrophysiology Consultation   Date: 9/14/2022  Reason for Consultation: New onset atrial fibrillation   Consult Requesting Physician: Chalino Morocho. NMD    No chief complaint on file. CC:  New onset atrial fibrillation  HPI: Yazmin Lozano is a 59 y.o. male with past medical history of left lung myxoid liposarcoma ,HTN . S/P left pneumonectomy with cryo ablation. 02/28/2022  [facility] . New onset atrial fibrillation wit RVR  03/02/2022 s/p Pneumonectomy . Treated with digoxin loading  and changed to metoprolol tartrate 12.5 mg BID on discharge 03/05/2022  Not Started on [facility] due to low Chads Vasc.      03/06/2022 holter monitor worn by the patient       Assessment and plan:    Paroxysmal Afib    - EKG today  ***   - in the setting of Left pneumonectomy with cryo ablation    - converted with digoxin load in hospital    - monitor 03/06/2022 showed ***   - continue metoprolol tartrate 12.5 mg BID for rate control   - Patient has a [id number]-AGTf Score of 1 ( HTN)     ~ not currently anticoagulated   - Treatment options including cardioversion, rate control strategy, antiarrhythmics, anticoagulation and possible ablation were discussed with patient. Risks, benefits and alternative of each treatment options were explained. All questions answered  - Afib risk factors including age, HTN, obesity, inactivity and LYNETTE were discussed with patient. Risk factor modification recommended   - concern for possible silent atrial fibrillation. - obtain recent monitor    - discussed RBBB     - further radiation treatment may trigger - if recurrent we can consider ablation. Discussed possible symptoms of recurrence and methods to monitor- pulse check , manual , with a BP cuff, Geodesic dome H[employer]ia mobile, smart watch , fit bit. - Discussed in detail about the risk factors, pathophysiology, and treatment options including life style modifications for atrial fibrillation. HTN  -Controlled/Uncontrolled  -BP goal <130/80  -Home BP monitoring encouraged, printed information provided on how to accurately measure BP at home.  -Counseled to follow a low salt diet to assure blood pressure remains controlled for cardiovascular risk factor modification.   -The patient is counseled to get regular exercise 3-5 times per week and maintain a healthy weight reduce cardiovascular risk factors. Lung sarcoma   - s/p surgery   - Follow up with pulmonary    S/p pneumonectomy and radiation therapy. Patient Active Problem List    Diagnosis Date Noted    Thoracic neuritis 06/21/2022    Liposarcoma ([facility] [address] 05/02/2022    Paroxysmal atrial fibrillation ([facility] [address] 03/21/2022    Sebaceous cyst 02/09/2022    Carpal tunnel syndrome of left wrist 06/08/2018    Dupuytren's contracture of left hand 06/08/2018    Cyst, dermoid, face     Skin lesion 02/27/2017    Impotence of organic origin 10/08/2014    HTN (hypertension) 09/17/2014     Diagnostic studies:   ECG 9/14/22  ***SR/AFIB  *** QTcH, ***QRS      Echo 03/02/2022  Tachycardia during study. Limited images. Left ventricle not optimally visualized. Overall grossly normal   left ventricular systolic function. Left ventricular hypertrophy. Right ventricle not optimally visualized. Decreased right   ventricular systolic function, probably moderately. Interpreting MD: Frederic Chou MD   Interpreted Date: 3/2/2022     NM stress 10/11/2021   Summary    Normal LV size and systolic function. Left ventricular ejection fraction of 58%. There is a fixed inferior defect with no associated wall motion abnormality    consistent with diaphragmatic attenuation artifact. No evidence of myocardium at risk for significant reversible ischemia. I independently reviewed the cardiac diagnostic studies, ECG and relevant imaging studies.      Lab Results   Component Value Date    LVEF 58 10/11/2021     No results found for: TSHFT4, TSH    Physical Examination:  There were no vitals filed for this visit. Wt Readings from Last 3 Encounters:   07/22/22 213 lb (96.6 kg)   06/21/22 212 lb 6 oz (96.3 kg)   05/06/22 225 lb 9.6 oz (102.3 kg)       Constitutional: Oriented. No distress. Head: Normocephalic and atraumatic. Mouth/Throat: Oropharynx is clear and moist.   Eyes: Conjunctivae normal. EOM are normal.   Neck: Neck supple. No rigidity. No JVD present. Cardiovascular: Normal rate, regular rhythm, S1&S2. Pulmonary/Chest: Bilateral respiratory sounds. Decreased BS on the left side   Abdominal: Soft. Bowel sounds present. No distension, No tenderness. Musculoskeletal: No tenderness. No edema    Lymphadenopathy: Has no cervical adenopathy. Neurological: Alert and oriented. Cranial nerve appears intact, No Gross deficit   Skin: Skin is warm and dry. No rash noted. Psychiatric: Has a normal behavior       Review of System:  [x] Full ROS obtained and negative except as mentioned in HPI    Prior to Admission medications    Medication Sig Start Date End Date Taking? Authorizing Provider   metoprolol tartrate (LOPRESSOR) 25 MG tablet TAKE 1/2 TABLET BY MOUTH TWICE A DAY 8/11/22   LILIA Ga - CNP   ibuprofen (ADVIL;MOTRIN) 800 MG tablet Take 1 tablet by mouth 3 times daily (with meals) 6/21/22   Caryl Worrell MD   gabapentin (NEURONTIN) 300 MG capsule Take 1 capsule by mouth in the morning and at bedtime for 90 days. 6/21/22 9/19/22  Caryl Worrell MD   traMADol (ULTRAM) 50 MG tablet Take 50 mg by mouth every 6 hours as needed for Pain.     Historical Provider, MD       Past Medical History:   Diagnosis Date    A-fib ([facility] [address]     Cancer ([facility] [address]     HTN (hypertension)         Past Surgical History:   Procedure Laterality Date    CT NEEDLE BIOPSY LUNG PERCUTANEOUS  07/20/2021    CT NEEDLE BIOPSY LUNG PERCUTANEOUS 7/20/2021 [facility] CT SCAN    CYST REMOVAL      March 2022    EXCISION OF FACIAL MASS Right 03/24/2017    right cheek    IR PORT PLACEMENT EQUAL OR GREATER THAN 5 YEARS  5/2/2022    IR PORT PLACEMENT EQUAL OR GREATER THAN 5 YEARS [facility]2/20[facility]    KNEE ARTHROSCOPY Right 1993    LUNG REMOVAL, TOTAL Left        No Known Allergies    Social History:  Reviewed. reports that he has never smoked. He has quit using smokeless tobacco.  His smokeless tobacco use included chew. He reports current alcohol use of about 12.0 standard drinks per week. He reports that he does not use drugs. Family History:  Reviewed. Reviewed. No family history of SCD. Relevant and available labs, and cardiovascular diagnostics reviewed. Reviewed. I independently reviewed all cardiac tracing. I independently reviewed relevant and available cardiac diagnostic tests ECG, CXR, Echo, Stress test, Device interrogation, Holter, CT scan. All questions and concerns were addressed to the patient/family. Alternatives to my treatment were discussed. I have discussed the above stated plan and the patient verbalized understanding and agreed with the plan. NOTE: This report was transcribed using voice recognition software. Every effort was made to ensure accuracy, however, inadvertent computerized transcription errors may be present.      Elinor Sheriff MD   [address]   Office: [phone]  Fax: [phone]

## 2022-09-15 NOTE — PROGRESS NOTES
[facility], [place], operated by [facility]   Electrophysiology Follow Up  Date: 9/20/2022    CC: PAF  HPI: Lorena Hodge is a 59 y.o. male with past medical history of left lung myxoid liposarcoma ,HTN . S/P left pneumonectomy with cryo ablation on  02/28/2022  [facility]. New onset atrial fibrillation wit RVR  03/02/2022 s/p Pneumonectomy . Treated with digoxin loading  and changed to metoprolol tartrate 12.5 mg BID on discharge 03/05/2022  Not Started on [facility] [place] operated by [facility] due to low [id number]-[id number] score. 03/06/2022 holter monitor worn by the patient showed 7% AF burden. Leah Rockwell presents to the office in follow up. He is doing ok but remains sore on his left side from his surgery. He has shortness of breath which is most likely related to his cancer resection. He completed radiation, and had surgery, one round of chemo and he quit after that. Discussed his holter monitor from 3/6/2022 which showed afib RVR, he was not symptomatic. He is followed by  at [facility]. Assessment and plan:    -Paroxysmal Atrial Fibrillation    Occurred first in the setting of Left pneumonectomy with cryo ablation      Appears asymptomatic with his atrial fibrillation in the past     Discussed treatment options in detail if this becomes more frequent or symptomatic in the future, once his cancer is stable and in remission. Will await MRI in 6 months to determine if ablation is reasonable    Right now he would like to focus on recovery from his lung surgery and lung cancer. We will continue to monitor his for recurrence of atrial fibrillation, may take extra dose of metoprolol if he feels his heart racing or if his HR is greater than 110 BPM      The risks, benefits and alternatives of the ablation procedure were discussed with the patient. If more frequent Afib episodes or symptomatic, and lung cancer is stable/in remission, then will consider ablation.       ECG today shows Sinus rhythm    continue metoprolol tartrate 12.5 mg BID for rate control   Patient has a low [id number]-RPKh Score ,  not currently anticoagulated,       -HTN   Since his diagnosis of lung cancer, he is off antihypertensive. BP goal <130/80  Home BP monitoring encouraged, printed information provided on how to accurately measure BP at home. Counseled to follow a low salt diet to assure blood pressure remains controlled for cardiovascular risk factor modification. The patient is counseled to get regular exercise 3-5 times per week and maintain a healthy weight reduce cardiovascular risk factors. -Myxoid liposarcoma   Follow up with pulmonary / [facility] by [facility]/     S/p Left pneumonectomy, thymic flap placement, and cryoablation at [facility] (Dr. Stepan Carranza) - 02/28/2022      Patient Active Problem List    Diagnosis Date Noted    Thoracic neuritis 06/21/2022    Liposarcoma ([facility] [address] 05/02/2022    Paroxysmal atrial fibrillation ([facility] [address] 03/21/2022    Sebaceous cyst 02/09/2022    Carpal tunnel syndrome of left wrist 06/08/2018    Dupuytren's contracture of left hand 06/08/2018    Cyst, dermoid, face     Skin lesion 02/27/2017    Impotence of organic origin 10/08/2014    HTN (hypertension) 09/17/2014     Diagnostic studies:   ECG 9/20/22  SR  415 QTcH, 144 QRS      Echo 03/02/2022  Tachycardia during study. Limited images. Left ventricle not optimally visualized. Overall grossly normal   left ventricular systolic function. Left ventricular hypertrophy. Right ventricle not optimally visualized. Decreased right   ventricular systolic function, probably moderately. Interpreting MD: Jennifer Cao MD   Interpreted Date: 3/2/2022     NM stress 10/11/2021   Summary    Normal LV size and systolic function. Left ventricular ejection fraction of 58%. There is a fixed inferior defect with no associated wall motion abnormality    consistent with diaphragmatic attenuation artifact. No evidence of myocardium at risk for significant reversible ischemia.          I independently reviewed the cardiac diagnostic studies, ECG and relevant imaging studies. Lab Results   Component Value Date    LVEF 58 10/11/2021     No results found for: TSHFT4, TSH    Physical Examination:  Vitals:    09/20/22 1614   BP: (!) 126/94   Pulse:    SpO2:         Wt Readings from Last 3 Encounters:   09/20/22 215 lb 6.4 oz (97.7 kg)   07/22/22 213 lb (96.6 kg)   06/21/22 212 lb 6 oz (96.3 kg)       Constitutional: Oriented. No distress. Head: Normocephalic and atraumatic. Mouth/Throat: Oropharynx is clear and moist.   Eyes: Conjunctivae normal. EOM are normal.   Neck: Neck supple. No rigidity. No JVD present. Cardiovascular: Normal rate, regular rhythm, S1&S2. Pulmonary/Chest: Bilateral respiratory sounds. Decreased BS on the left side   Abdominal: Soft. Bowel sounds present. No distension, No tenderness. Musculoskeletal: No tenderness. No edema    Lymphadenopathy: Has no cervical adenopathy. Neurological: Alert and oriented. Cranial nerve appears intact, No Gross deficit   Skin: Skin is warm and dry. No rash noted. Psychiatric: Has a normal behavior       Review of System:  [x] Full ROS obtained and negative except as mentioned in HPI    Prior to Admission medications    Medication Sig Start Date End Date Taking? Authorizing Provider   metoprolol tartrate (LOPRESSOR) 25 MG tablet TAKE 1/2 TABLET BY MOUTH TWICE A DAY 8/11/22  Yes Abdifatah Manning, APRN - CNP   gabapentin (NEURONTIN) 300 MG capsule Take 1 capsule by mouth in the morning and at bedtime for 90 days. 6/21/22 9/20/22 Yes Juvenal Wise MD   ibuprofen (ADVIL;MOTRIN) 800 MG tablet Take 1 tablet by mouth 3 times daily (with meals)  Patient not taking: Reported on 9/20/2022 6/21/22   Juvenal Wise MD   traMADol (ULTRAM) 50 MG tablet Take 50 mg by mouth every 6 hours as needed for Pain.     Historical Provider, MD       Past Medical History:   Diagnosis Date    A-fib ([facility] [address]     Cancer ([facility] [address]     HTN (hypertension)         Past Surgical History:   Procedure Laterality Date    CT NEEDLE BIOPSY LUNG PERCUTANEOUS  07/20/2021    CT NEEDLE BIOPSY LUNG PERCUTANEOUS 7/20/2021 [facility] CT SCAN    CYST REMOVAL      March 2022    EXCISION OF FACIAL MASS Right 03/24/2017    right cheek    IR PORT PLACEMENT EQUAL OR GREATER THAN 5 YEARS  5/2/2022    IR PORT PLACEMENT EQUAL OR GREATER THAN 5 YEARS [facility]2/2022 [facility]    KNEE ARTHROSCOPY Right 1993    LUNG REMOVAL, TOTAL Left        No Known Allergies    Social History:  Reviewed. reports that he has never smoked. He has quit using smokeless tobacco.  His smokeless tobacco use included chew. He reports current alcohol use of about 12.0 standard drinks per week. He reports that he does not use drugs. Family History:  Reviewed. Reviewed. No family history of SCD. Relevant and available labs, and cardiovascular diagnostics reviewed. Reviewed. I independently reviewed all cardiac tracing. I independently reviewed relevant and available cardiac diagnostic tests ECG, CXR, Echo, Stress test, Device interrogation, Holter, CT scan. All questions and concerns were addressed to the patient/family. Alternatives to my treatment were discussed. I have discussed the above stated plan and the patient verbalized understanding and agreed with the plan. Scribe attestation: This note was scribed in the presence of Domenic Myers MD by Allison Alston RN  Physician Attestation: I, Dr. Domenic Myers, confirm that the scribe's documentation has been prepared under my direction and personally reviewed by me in its entirety. I also confirm that the note above accurately reflects all work, treatment, procedures, and medical decision making performed by me. NOTE: This report was transcribed using voice recognition software. Every effort was made to ensure accuracy, however, inadvertent computerized transcription errors may be present.      Homer Tapia MD   [facility] Office: [phone]  Fax: [phone]

## 2022-09-20 ENCOUNTER — OFFICE VISIT (OUTPATIENT)
Dept: CARDIOLOGY CLINIC | Age: 57
End: 2022-09-20
Payer: COMMERCIAL

## 2022-09-20 VITALS
WEIGHT: 215.4 LBS | SYSTOLIC BLOOD PRESSURE: 126 MMHG | HEIGHT: 75 IN | DIASTOLIC BLOOD PRESSURE: 94 MMHG | BODY MASS INDEX: 26.78 KG/M2 | OXYGEN SATURATION: 97 % | HEART RATE: 74 BPM

## 2022-09-20 DIAGNOSIS — I48.0 PAROXYSMAL ATRIAL FIBRILLATION (HCC): Primary | ICD-10-CM

## 2022-09-20 PROCEDURE — 99214 OFFICE O/P EST MOD 30 MIN: CPT | Performed by: INTERNAL MEDICINE

## 2022-09-20 PROCEDURE — 93000 ELECTROCARDIOGRAM COMPLETE: CPT | Performed by: INTERNAL MEDICINE

## 2022-09-20 NOTE — PATIENT INSTRUCTIONS
-Check your pulse daily  -If you stay in atrial fibrillation, HR > 110 you may take an extra dose of metoprolol

## 2022-10-17 RX ORDER — GABAPENTIN 300 MG/1
300 CAPSULE ORAL 2 TIMES DAILY
Qty: 60 CAPSULE | Refills: 2 | Status: CANCELLED | OUTPATIENT
Start: 2022-10-17 | End: 2023-01-15

## 2022-10-17 NOTE — TELEPHONE ENCOUNTER
Patient called needing a refill on his Gabapentin 300mg.     Next appt 1/24/2023          Zhang Knight in chart

## 2022-10-18 RX ORDER — GABAPENTIN 300 MG/1
300 CAPSULE ORAL 2 TIMES DAILY
Qty: 60 CAPSULE | Refills: 2 | Status: SHIPPED | OUTPATIENT
Start: 2022-10-18 | End: 2022-10-24

## 2022-10-24 RX ORDER — GABAPENTIN 300 MG/1
CAPSULE ORAL
Qty: 60 CAPSULE | Refills: 2 | Status: SHIPPED | OUTPATIENT
Start: 2022-10-24 | End: 2023-01-22

## 2022-10-24 NOTE — TELEPHONE ENCOUNTER
Medication:   Requested Prescriptions     Pending Prescriptions Disp Refills    gabapentin (NEURONTIN) 300 MG capsule [Pharmacy Med Name: GABAPENTIN 300 MG CAPSULE] 60 capsule 2     Sig: TAKE 1 CAPSULE BY MOUTH IN THE MORNING AND AT BEDTIME      Last Filled:      Patient Phone Number: [phone] (home)     Last appt: 7/22/2022   Next appt: 1/24/2023    Last OARRS: No flowsheet data found.   PDMP Monitoring:    Last PDMP Shruthi Cisse as Reviewed [facility]):  Review User Review Instant Review Result          Preferred Pharmacy:   [facility]/pharmacy [address] [address]  Phone: [phone] Fax: [phone]

## 2023-01-24 ENCOUNTER — OFFICE VISIT (OUTPATIENT)
Dept: FAMILY MEDICINE CLINIC | Age: 58
End: 2023-01-24
Payer: COMMERCIAL

## 2023-01-24 VITALS
BODY MASS INDEX: 29.12 KG/M2 | TEMPERATURE: 98.2 F | WEIGHT: 233 LBS | SYSTOLIC BLOOD PRESSURE: 124 MMHG | OXYGEN SATURATION: 99 % | DIASTOLIC BLOOD PRESSURE: 86 MMHG | HEART RATE: 79 BPM

## 2023-01-24 DIAGNOSIS — I10 PRIMARY HYPERTENSION: ICD-10-CM

## 2023-01-24 DIAGNOSIS — Z13.220 SCREENING FOR LIPID DISORDERS: ICD-10-CM

## 2023-01-24 DIAGNOSIS — Z23 NEED FOR IMMUNIZATION AGAINST INFLUENZA: Primary | ICD-10-CM

## 2023-01-24 DIAGNOSIS — Z12.5 SCREENING FOR MALIGNANT NEOPLASM OF PROSTATE: ICD-10-CM

## 2023-01-24 DIAGNOSIS — I48.0 PAROXYSMAL ATRIAL FIBRILLATION (HCC): ICD-10-CM

## 2023-01-24 DIAGNOSIS — G89.29 CHRONIC LEFT SHOULDER PAIN: ICD-10-CM

## 2023-01-24 DIAGNOSIS — M25.512 CHRONIC LEFT SHOULDER PAIN: ICD-10-CM

## 2023-01-24 PROCEDURE — 90471 IMMUNIZATION ADMIN: CPT | Performed by: FAMILY MEDICINE

## 2023-01-24 PROCEDURE — 3074F SYST BP LT 130 MM HG: CPT | Performed by: FAMILY MEDICINE

## 2023-01-24 PROCEDURE — 90674 CCIIV4 VAC NO PRSV 0.5 ML IM: CPT | Performed by: FAMILY MEDICINE

## 2023-01-24 PROCEDURE — 99214 OFFICE O/P EST MOD 30 MIN: CPT | Performed by: FAMILY MEDICINE

## 2023-01-24 PROCEDURE — 3079F DIAST BP 80-89 MM HG: CPT | Performed by: FAMILY MEDICINE

## 2023-01-24 RX ORDER — GABAPENTIN 300 MG/1
CAPSULE ORAL
Qty: 60 CAPSULE | Refills: 5 | Status: SHIPPED | OUTPATIENT
Start: 2023-01-24 | End: 2023-02-24

## 2023-01-24 ASSESSMENT — PATIENT HEALTH QUESTIONNAIRE - PHQ9
SUM OF ALL RESPONSES TO PHQ QUESTIONS 1-9: 0
SUM OF ALL RESPONSES TO PHQ QUESTIONS 1-9: 0
2. FEELING DOWN, DEPRESSED OR HOPELESS: 0
SUM OF ALL RESPONSES TO PHQ QUESTIONS 1-9: 0
1. LITTLE INTEREST OR PLEASURE IN DOING THINGS: 0
SUM OF ALL RESPONSES TO PHQ QUESTIONS 1-9: 0
SUM OF ALL RESPONSES TO PHQ9 QUESTIONS 1 & 2: 0

## 2023-01-24 ASSESSMENT — ENCOUNTER SYMPTOMS
CONSTIPATION: 0
SHORTNESS OF BREATH: 1
RHINORRHEA: 0
DIARRHEA: 0

## 2023-01-24 NOTE — PROGRESS NOTES
SUBJECTIVE:    Shweta Castelan is a 59 y.o. male who presents for a follow up visit. Chief Complaint   Patient presents with    Follow-up     Patient is here for a follow up. No new concerns. Hypertension  This is a chronic problem. The current episode started more than 1 year ago. The problem is controlled. Associated symptoms include anxiety and shortness of breath. Pertinent negatives include no chest pain, palpitations or peripheral edema. Risk factors for coronary artery disease include male gender, sedentary lifestyle and dyslipidemia. Past treatments include beta blockers. The current treatment provides significant improvement. Compliance problems include exercise and diet. Atrial Fibrillation  Presents for follow-up visit. Symptoms include shortness of breath. Symptoms are negative for chest pain, dizziness and palpitations. Patient is followed by Dr. Darrel Rondon. With his history of lung cancer and minimal symptoms of A. fib patient has chosen to not be on any anticoagulation his heart rate is controlled with metoprolol 12.5 mg twice daily. Lung Cancer  MRI scheduled next month, appt with Rad oncologist at [facility] next month, then appt with Dr. Fox Sanchez        Patient's medications, allergies, past medical,surgical, social and family histories were reviewed and updated as appropriate.      Past Medical History:   Diagnosis Date    A-fib ([facility] [address]     Cancer ([facility] [address]     Chronic left shoulder pain     HTN (hypertension)      Past Surgical History:   Procedure Laterality Date    CT NEEDLE BIOPSY LUNG PERCUTANEOUS  07/20/2021    CT NEEDLE BIOPSY LUNG PERCUTANEOUS 7/20/2021 [facility] CT SCAN    CYST REMOVAL      March 2022    EXCISION OF FACIAL MASS Right 03/24/2017    right cheek    IR PORT PLACEMENT EQUAL OR GREATER THAN 5 YEARS  5/2/2022    IR PORT PLACEMENT EQUAL OR GREATER THAN 5 YEARS 5/2/2022 Claudetta Pleas, MD [facility] SPECIAL PROCEDURES    KNEE ARTHROSCOPY Right 1993    LUNG REMOVAL, TOTAL Left      Family History   Problem Relation Age of Onset    High Blood Pressure Mother     Cancer Father     Diabetes Father     High Blood Pressure Father     High Cholesterol Father     Heart Disease Father      Social History     Tobacco Use    Smoking status: Never    Smokeless tobacco: Former     Types: Chew   Substance Use Topics    Alcohol use: Yes     Alcohol/week: 12.0 standard drinks     Types: 12 Cans of beer per week      No Known Allergies  Current Outpatient Medications on File Prior to Visit   Medication Sig Dispense Refill    metoprolol tartrate (LOPRESSOR) 25 MG tablet TAKE 1/2 TABLET BY MOUTH TWICE A DAY 90 tablet 1     No current facility-administered medications on file prior to visit. Review of Systems   HENT:  Negative for congestion, postnasal drip and rhinorrhea. Respiratory:  Positive for shortness of breath. Cardiovascular:  Negative for chest pain, palpitations and leg swelling. Gastrointestinal:  Negative for constipation and diarrhea. Genitourinary:  Negative for difficulty urinating. Musculoskeletal:  Positive for arthralgias (left shoulder). Neurological:  Negative for dizziness and light-headedness. Psychiatric/Behavioral:  Negative for dysphoric mood and sleep disturbance. The patient is not nervous/anxious. OBJECTIVE:    /86   Pulse 79   Temp 98.2 °F (36.8 °C)   Wt 233 lb (105.7 kg)   SpO2 99%   BMI 29.12 kg/m²    Physical Exam  Constitutional:       Appearance: He is well-developed. HENT:      Head: Normocephalic and atraumatic. Right Ear: External ear normal.      Left Ear: External ear normal.      Nose: Nose normal.   Eyes:      General:         Right eye: No discharge. Conjunctiva/sclera: Conjunctivae normal.   Neck:      Thyroid: No thyromegaly. Vascular: No JVD. Trachea: No tracheal deviation. Cardiovascular:      Rate and Rhythm: Normal rate and regular rhythm. Heart sounds: Normal heart sounds.    Pulmonary:      Effort: Pulmonary effort is normal. No respiratory distress. Breath sounds: Examination of the left-upper field reveals decreased breath sounds. Examination of the left-middle field reveals decreased breath sounds. Examination of the left-lower field reveals decreased breath sounds. Decreased breath sounds present. No rales. Musculoskeletal:      Cervical back: Normal range of motion and neck supple. Lymphadenopathy:      Cervical: No cervical adenopathy. Skin:     General: Skin is warm and dry. Neurological:      Mental Status: He is alert and oriented to person, place, and time. ASSESSMENT/PLAN:    Reilly Muhammad was seen today for follow-up. Diagnoses and all orders for this visit:    Need for immunization against influenza  -     Influenza, FLUCELVAX, (age 10 mo+), IM, Preservative Free, 0.5 mL    Chronic left shoulder pain  -     gabapentin (NEURONTIN) 300 MG capsule; TAKE 1 CAPSULE BY MOUTH IN THE MORNING AND AT BEDTIME    Screening for lipid disorders  -     Comprehensive Metabolic Panel, Fasting; Future  -     CBC with Auto Differential; Future  -     Lipid, Fasting; Future  -     TSH with Reflex; Future    Screening for malignant neoplasm of prostate  -     PSA Screening; Future    Paroxysmal atrial fibrillation (HCC)  Rate controlled with metoprolol. He is on no anticoagulant. Primary hypertension  Good control with metoprolol    Return in about 3 months (around 4/24/2023). Please note portions of this note were completed with a voicerecognition program.  Efforts were made to edit the dictations but occasionally words are mis-transcribed.

## 2023-06-06 ENCOUNTER — TELEPHONE (OUTPATIENT)
Dept: FAMILY MEDICINE CLINIC | Age: 58
End: 2023-06-06

## 2023-06-06 DIAGNOSIS — G89.29 CHRONIC LEFT SHOULDER PAIN: ICD-10-CM

## 2023-06-06 DIAGNOSIS — M25.512 CHRONIC LEFT SHOULDER PAIN: ICD-10-CM

## 2023-06-06 RX ORDER — GABAPENTIN 300 MG/1
CAPSULE ORAL
Qty: 60 CAPSULE | Refills: 0 | Status: SHIPPED | OUTPATIENT
Start: 2023-06-06 | End: 2023-07-07

## 2023-06-06 NOTE — TELEPHONE ENCOUNTER
gabapentin (NEURONTIN) 300 MG capsule     [facility]/pharmacy #[id number] [place], [place] - [address], [address]   [facility] [address] [place] [address]   Phone:  [phone]  Fax:  [phone]

## 2023-06-06 NOTE — TELEPHONE ENCOUNTER
Patient called and would like to speak to provider about the results of his labs ordered by Dr Alexandria Camarena on 5/25.    Please advise

## 2023-06-08 NOTE — TELEPHONE ENCOUNTER
Called patient. Advised per PCP, CBC is within range, thyroid within range, cholesterol within range. All labs look great. Patient stated understanding.

## 2023-06-19 NOTE — TELEPHONE ENCOUNTER
Last OV:09/20/2022  Antonio  Last Labs:Ekg-09/20/2022  [facility]  Next OV:None  Last Refill: Metoprolol-08/11/2022  Sb Mina

## 2023-12-22 NOTE — TELEPHONE ENCOUNTER
Last OV : 9/20/22 RMM    Last EKG : 9/20/22     Last Refill : 9/18/23 90w0     Next OV : none

## 2024-02-07 DIAGNOSIS — G89.29 CHRONIC LEFT SHOULDER PAIN: ICD-10-CM

## 2024-02-07 DIAGNOSIS — M25.512 CHRONIC LEFT SHOULDER PAIN: ICD-10-CM

## 2024-02-07 RX ORDER — GABAPENTIN 300 MG/1
CAPSULE ORAL
Qty: 60 CAPSULE | Refills: 0 | OUTPATIENT
Start: 2024-02-07

## 2024-02-07 NOTE — TELEPHONE ENCOUNTER
Last ov:22 RMM  Next ov:  Last EK22  Last labs:22  Last filled:   Disp Refills Start End    metoprolol tartrate (LOPRESSOR) 25 MG tablet 90 tablet 0 2023 --    Sig: TAKE 1/2 TABLET TWICE A DAY BY MOUTH    Sent to pharmacy as: Metoprolol Tartrate 25 MG Oral Tablet (LOPRESSOR)    E-Prescribing Status: Receipt confirmed by pharmacy (2023  3:41 PM EDT)

## 2024-02-08 NOTE — TELEPHONE ENCOUNTER
Called and spoke to the patient and made him a 6 month follow up so please send in enough to get him to his appt.

## 2024-02-09 DIAGNOSIS — I10 PRIMARY HYPERTENSION: ICD-10-CM

## 2024-02-09 DIAGNOSIS — Z12.5 SCREENING FOR MALIGNANT NEOPLASM OF PROSTATE: ICD-10-CM

## 2024-02-09 DIAGNOSIS — Z13.220 SCREENING FOR LIPID DISORDERS: Primary | ICD-10-CM

## 2024-02-13 ENCOUNTER — OFFICE VISIT (OUTPATIENT)
Dept: FAMILY MEDICINE CLINIC | Age: 59
End: 2024-02-13
Payer: MEDICARE

## 2024-02-13 VITALS
RESPIRATION RATE: 20 BRPM | DIASTOLIC BLOOD PRESSURE: 82 MMHG | HEART RATE: 71 BPM | OXYGEN SATURATION: 98 % | SYSTOLIC BLOOD PRESSURE: 126 MMHG | TEMPERATURE: 98.8 F | WEIGHT: 260.2 LBS | BODY MASS INDEX: 32.52 KG/M2

## 2024-02-13 DIAGNOSIS — M25.512 CHRONIC LEFT SHOULDER PAIN: ICD-10-CM

## 2024-02-13 DIAGNOSIS — I48.0 PAROXYSMAL ATRIAL FIBRILLATION (HCC): Primary | ICD-10-CM

## 2024-02-13 DIAGNOSIS — E78.00 PURE HYPERCHOLESTEROLEMIA: ICD-10-CM

## 2024-02-13 DIAGNOSIS — I10 PRIMARY HYPERTENSION: ICD-10-CM

## 2024-02-13 DIAGNOSIS — Z12.5 SCREENING FOR MALIGNANT NEOPLASM OF PROSTATE: ICD-10-CM

## 2024-02-13 DIAGNOSIS — C49.9 LIPOSARCOMA (HCC): ICD-10-CM

## 2024-02-13 DIAGNOSIS — G89.29 CHRONIC LEFT SHOULDER PAIN: ICD-10-CM

## 2024-02-13 PROCEDURE — 3079F DIAST BP 80-89 MM HG: CPT | Performed by: FAMILY MEDICINE

## 2024-02-13 PROCEDURE — 99214 OFFICE O/P EST MOD 30 MIN: CPT | Performed by: FAMILY MEDICINE

## 2024-02-13 PROCEDURE — 3074F SYST BP LT 130 MM HG: CPT | Performed by: FAMILY MEDICINE

## 2024-02-13 RX ORDER — GABAPENTIN 300 MG/1
CAPSULE ORAL
Qty: 180 CAPSULE | Refills: 3 | Status: SHIPPED | OUTPATIENT
Start: 2024-02-13 | End: 2024-10-22

## 2024-02-13 NOTE — PROGRESS NOTES
future.      Patient's medications, allergies, past medical,surgical, social and family histories were reviewed and updated as appropriate.     Past Medical History:   Diagnosis Date    A-fib (HCC)     Cancer (HCC)     Chronic left shoulder pain     HTN (hypertension)      Past Surgical History:   Procedure Laterality Date    CT NEEDLE BIOPSY LUNG PERCUTANEOUS  07/20/2021    CT NEEDLE BIOPSY LUNG PERCUTANEOUS [facility]    CYST REMOVAL      March 2022    EXCISION OF FACIAL MASS Right 03/24/2017    right cheek    IR PORT PLACEMENT EQUAL OR GREATER THAN 5 YEARS  5/2/2022    IR PORT PLACEMENT EQUAL OR GREATER THAN 5 YEARS [facility]2/2022 [facility]    KNEE ARTHROSCOPY Right 1993    LUNG REMOVAL, TOTAL Left      Family History   Problem Relation Age of Onset    High Blood Pressure Mother     Cancer Father     Diabetes Father     High Blood Pressure Father     High Cholesterol Father     Heart Disease Father      Social History     Tobacco Use    Smoking status: Never    Smokeless tobacco: Former     Types: Chew   Substance Use Topics    Alcohol use: Yes     Alcohol/week: 12.0 standard drinks of alcohol     Types: 12 Cans of beer per week      No Known Allergies  Current Outpatient Medications on File Prior to Visit   Medication Sig Dispense Refill    metoprolol tartrate (LOPRESSOR) 25 MG tablet TAKE 1/2 TABLET TWICE A DAY BY MOUTH 90 tablet 1     No current facility-administered medications on file prior to visit.        Review of Systems   HENT:  Positive for rhinorrhea. Negative for congestion and postnasal drip.    Respiratory:  Positive for shortness of breath (CARTAGENA). Negative for chest tightness.    Cardiovascular:  Negative for chest pain and palpitations.   Gastrointestinal:  Negative for constipation and diarrhea.   Genitourinary:  Negative for difficulty urinating.   Musculoskeletal:  Positive for arthralgias (left shoulder).   Neurological:  Negative for dizziness,

## 2024-03-06 ENCOUNTER — TELEPHONE (OUTPATIENT)
Dept: CARDIOLOGY CLINIC | Age: 59
End: 2024-03-06

## 2024-03-06 NOTE — TELEPHONE ENCOUNTER
Spoke with patient and he had requested to see [facility],  would like to  transfer care to [facility].

## 2024-03-06 NOTE — PROGRESS NOTES
affecting prognosis and outcome of EP interventions       - The patient is counseled to follow a low salt diet to assure blood pressure remains controlled for cardiovascular risk factor modification.   - The patient is counseled to avoid excess caffeine, and energy drinks as this may exacerbated ectopy and arrhythmia.   - The patient is counseled to get regular exercise 3-5 times per week to control cardiovascular risk factors.          All questions and concerns were addressed to the patient/family. Alternatives to my treatment were discussed. I have discussed the above stated plan and the patient verbalized understanding and agreed with the plan.    Scribe attestation: This note was scribed in the presence of Mikel Johnson MD by Fartun Gutierrez RN    I, Dr. Mikel Johnson personally performed the services described in this documentation as scribed by RN in my presence, and it is both accurate and complete.         NOTE: This report was transcribed using voice recognition software. Every effort was made to ensure accuracy, however, inadvertent computerized transcription errors may be present.     Mikel Johnson MD, RS, [facility]   Office: [phone]  Fax: [phone]

## 2024-03-07 ENCOUNTER — OFFICE VISIT (OUTPATIENT)
Dept: CARDIOLOGY CLINIC | Age: 59
End: 2024-03-07
Payer: MEDICARE

## 2024-03-07 VITALS
WEIGHT: 253.9 LBS | DIASTOLIC BLOOD PRESSURE: 92 MMHG | HEART RATE: 71 BPM | SYSTOLIC BLOOD PRESSURE: 150 MMHG | OXYGEN SATURATION: 98 % | HEIGHT: 75 IN | BODY MASS INDEX: 31.57 KG/M2

## 2024-03-07 DIAGNOSIS — I10 PRIMARY HYPERTENSION: ICD-10-CM

## 2024-03-07 DIAGNOSIS — C49.9 LIPOSARCOMA (HCC): ICD-10-CM

## 2024-03-07 DIAGNOSIS — I48.0 PAROXYSMAL ATRIAL FIBRILLATION (HCC): Primary | ICD-10-CM

## 2024-03-07 PROCEDURE — 93000 ELECTROCARDIOGRAM COMPLETE: CPT | Performed by: INTERNAL MEDICINE

## 2024-03-07 PROCEDURE — 3077F SYST BP >= 140 MM HG: CPT | Performed by: INTERNAL MEDICINE

## 2024-03-07 PROCEDURE — 3080F DIAST BP >= 90 MM HG: CPT | Performed by: INTERNAL MEDICINE

## 2024-03-07 PROCEDURE — 99214 OFFICE O/P EST MOD 30 MIN: CPT | Performed by: INTERNAL MEDICINE

## 2024-03-07 NOTE — PROGRESS NOTES
Review of Systems:  Constitutional:  No night sweats, no headaches, + weight loss, ~ 40 # in past year. Eyes:  No glaucoma, no cataracts. Wears glasses. ENMT:  No nosebleeds, no deviated septum. Cardiac:  No arrhythmias. Vascular:  No claudication, no varicosities. GI:  No PUD, no heartburn. :  No kidney stones, no frequent UTIs  Musculoskeletal:  No arthritis, no gout. Respiratory:  + SOB,  No emphysema, no  Asthma. + CARTAGENA  Integumentary:  No dermatitis, no itching, no rash. Neurological:  No stroke, no TIAs, no seizures. Psychiatric:  No depression, no anxiety. Endocrine: No diabetes, no  thyroid issues. Hematologic:  No bleeding, no easy bruising. Immunologic:  No known cancer, no steroid therapies.
negative  RESPIRATORY:  positive for  dry cough  CARDIOVASCULAR:  negative  GASTROINTESTINAL:  negative  GENITO-URINARY: negative  ENDOCRINE: negative  MUSCULOSKELETAL:  negative  HEMATOLOGICAL AND LYMPHATIC: negative  IMMUNOLOGICAL: negative  PSYCHOLOGICAL: negative    PHYSICAL EXAM:    VITALS:  /78 (Site: Left Upper Arm, Position: Sitting)   Pulse 90   Resp 14   Ht 6' 3\" (1.905 m)   Wt 236 lb 12.8 oz (107.4 kg)   SpO2 97%   BMI 29.60 kg/m²     Neck:  trachea midline    Extremities:   No clubbing, or cyanosis, or edema     Neurological: awake, alert and oriented x 3    Psychiatric: Mood and affect appear appropriate    DATA:    Other diagnostic test:  PET / CT reviewed with the patient and his wife    ASSESSMENT AND PLAN:    Large left lung spindle cell tumor    I discussed the findings with the patient and his wife, discussed surgical options, possible alternative plans of care and answered all their questions after reviewing data with them for 30 minutes. As they are getting another opinion next week this was largely an information gathering visit for them. I encouraged them to get all the data from [place] and that he would require a thoracotomy an likely a pneumonectomy with his limited pulmonary function (albeit functionally likely has at least a bilobectomy from tumor size) to determine resectability. I would be happy to participate in his care. They will call after evaluation in [place] to let us know what course they decide to pursue. I re-assured them that they can decide whichever course of therapy is best for the patient. Thank you for allowing me to participate in the care of this very nice gentleman.     Electronically signed by Mandie Gomez MD on 9/2/2021 at 4:54 PM
no...

## 2024-08-01 DIAGNOSIS — E78.00 PURE HYPERCHOLESTEROLEMIA: ICD-10-CM

## 2024-08-01 DIAGNOSIS — I10 PRIMARY HYPERTENSION: ICD-10-CM

## 2024-08-01 DIAGNOSIS — Z12.5 SCREENING FOR MALIGNANT NEOPLASM OF PROSTATE: ICD-10-CM

## 2024-08-01 LAB
ALBUMIN SERPL-MCNC: 4.5 G/DL (ref 3.4–5)
ALBUMIN/GLOB SERPL: 1.4 {RATIO} (ref 1.1–2.2)
ALP SERPL-CCNC: 127 U/L (ref 40–129)
ALT SERPL-CCNC: 25 U/L (ref 10–40)
ANION GAP SERPL CALCULATED.3IONS-SCNC: 12 MMOL/L (ref 3–16)
AST SERPL-CCNC: 20 U/L (ref 15–37)
BASOPHILS # BLD: 0 K/UL (ref 0–0.2)
BASOPHILS NFR BLD: 0.6 %
BILIRUB SERPL-MCNC: 0.6 MG/DL (ref 0–1)
BUN SERPL-MCNC: 12 MG/DL (ref 7–20)
CALCIUM SERPL-MCNC: 9.2 MG/DL (ref 8.3–10.6)
CHLORIDE SERPL-SCNC: 103 MMOL/L (ref 99–110)
CHOLEST SERPL-MCNC: 155 MG/DL (ref 0–199)
CO2 SERPL-SCNC: 24 MMOL/L (ref 21–32)
CREAT SERPL-MCNC: 0.7 MG/DL (ref 0.8–1.3)
DEPRECATED RDW RBC AUTO: 13.9 % (ref 12.4–15.4)
EOSINOPHIL # BLD: 0.1 K/UL (ref 0–0.6)
EOSINOPHIL NFR BLD: 1 %
GFR SERPLBLD CREATININE-BSD FMLA CKD-EPI: >90 ML/MIN/{1.73_M2}
GLUCOSE P FAST SERPL-MCNC: 100 MG/DL (ref 70–99)
HCT VFR BLD AUTO: 41.3 % (ref 40.5–52.5)
HDLC SERPL-MCNC: 43 MG/DL (ref 40–60)
HGB BLD-MCNC: 14.2 G/DL (ref 13.5–17.5)
LDL CHOLESTEROL: 99 MG/DL
LYMPHOCYTES # BLD: 0.7 K/UL (ref 1–5.1)
LYMPHOCYTES NFR BLD: 12.2 %
MCH RBC QN AUTO: 31.2 PG (ref 26–34)
MCHC RBC AUTO-ENTMCNC: 34.4 G/DL (ref 31–36)
MCV RBC AUTO: 90.9 FL (ref 80–100)
MONOCYTES # BLD: 0.5 K/UL (ref 0–1.3)
MONOCYTES NFR BLD: 8.7 %
NEUTROPHILS # BLD: 4.5 K/UL (ref 1.7–7.7)
NEUTROPHILS NFR BLD: 77.5 %
PLATELET # BLD AUTO: 156 K/UL (ref 135–450)
PMV BLD AUTO: 10.1 FL (ref 5–10.5)
POTASSIUM SERPL-SCNC: 4.4 MMOL/L (ref 3.5–5.1)
PROT SERPL-MCNC: 7.7 G/DL (ref 6.4–8.2)
PSA SERPL DL<=0.01 NG/ML-MCNC: 0.44 NG/ML (ref 0–4)
RBC # BLD AUTO: 4.55 M/UL (ref 4.2–5.9)
SODIUM SERPL-SCNC: 139 MMOL/L (ref 136–145)
TRIGL SERPL-MCNC: 67 MG/DL (ref 0–150)
TSH SERPL DL<=0.005 MIU/L-ACNC: 3.87 UIU/ML (ref 0.27–4.2)
VLDLC SERPL CALC-MCNC: 13 MG/DL
WBC # BLD AUTO: 5.8 K/UL (ref 4–11)

## 2024-08-12 SDOH — HEALTH STABILITY: PHYSICAL HEALTH: ON AVERAGE, HOW MANY DAYS PER WEEK DO YOU ENGAGE IN MODERATE TO STRENUOUS EXERCISE (LIKE A BRISK WALK)?: 1 DAY

## 2024-08-12 SDOH — HEALTH STABILITY: PHYSICAL HEALTH: ON AVERAGE, HOW MANY MINUTES DO YOU ENGAGE IN EXERCISE AT THIS LEVEL?: 20 MIN

## 2024-08-12 ASSESSMENT — LIFESTYLE VARIABLES
HOW OFTEN DURING THE LAST YEAR HAVE YOU BEEN UNABLE TO REMEMBER WHAT HAPPENED THE NIGHT BEFORE BECAUSE YOU HAD BEEN DRINKING: NEVER
HAVE YOU OR SOMEONE ELSE BEEN INJURED AS A RESULT OF YOUR DRINKING: NO
HAVE YOU OR SOMEONE ELSE BEEN INJURED AS A RESULT OF YOUR DRINKING: NO
HOW OFTEN DURING THE LAST YEAR HAVE YOU FOUND THAT YOU WERE NOT ABLE TO STOP DRINKING ONCE YOU HAD STARTED: NEVER
HOW OFTEN DURING THE LAST YEAR HAVE YOU NEEDED AN ALCOHOLIC DRINK FIRST THING IN THE MORNING TO GET YOURSELF GOING AFTER A NIGHT OF HEAVY DRINKING: NEVER
HAS A RELATIVE, FRIEND, DOCTOR, OR ANOTHER HEALTH PROFESSIONAL EXPRESSED CONCERN ABOUT YOUR DRINKING OR SUGGESTED YOU CUT DOWN: NO
HOW OFTEN DURING THE LAST YEAR HAVE YOU FAILED TO DO WHAT WAS NORMALLY EXPECTED FROM YOU BECAUSE OF DRINKING: NEVER
HOW OFTEN DURING THE LAST YEAR HAVE YOU FOUND THAT YOU WERE NOT ABLE TO STOP DRINKING ONCE YOU HAD STARTED: NEVER
HOW OFTEN DO YOU HAVE A DRINK CONTAINING ALCOHOL: 4
HOW OFTEN DURING THE LAST YEAR HAVE YOU FAILED TO DO WHAT WAS NORMALLY EXPECTED FROM YOU BECAUSE OF DRINKING: NEVER
HOW OFTEN DURING THE LAST YEAR HAVE YOU BEEN UNABLE TO REMEMBER WHAT HAPPENED THE NIGHT BEFORE BECAUSE YOU HAD BEEN DRINKING: NEVER
HOW MANY STANDARD DRINKS CONTAINING ALCOHOL DO YOU HAVE ON A TYPICAL DAY: 3
HOW OFTEN DO YOU HAVE A DRINK CONTAINING ALCOHOL: 2-3 TIMES A WEEK
HOW OFTEN DURING THE LAST YEAR HAVE YOU NEEDED AN ALCOHOLIC DRINK FIRST THING IN THE MORNING TO GET YOURSELF GOING AFTER A NIGHT OF HEAVY DRINKING: NEVER
HAS A RELATIVE, FRIEND, DOCTOR, OR ANOTHER HEALTH PROFESSIONAL EXPRESSED CONCERN ABOUT YOUR DRINKING OR SUGGESTED YOU CUT DOWN: NO
HOW OFTEN DURING THE LAST YEAR HAVE YOU HAD A FEELING OF GUILT OR REMORSE AFTER DRINKING: NEVER
HOW MANY STANDARD DRINKS CONTAINING ALCOHOL DO YOU HAVE ON A TYPICAL DAY: 5 OR 6
HOW OFTEN DURING THE LAST YEAR HAVE YOU HAD A FEELING OF GUILT OR REMORSE AFTER DRINKING: NEVER
HOW OFTEN DO YOU HAVE SIX OR MORE DRINKS ON ONE OCCASION: 2

## 2024-08-12 ASSESSMENT — PATIENT HEALTH QUESTIONNAIRE - PHQ9
1. LITTLE INTEREST OR PLEASURE IN DOING THINGS: MORE THAN HALF THE DAYS
SUM OF ALL RESPONSES TO PHQ9 QUESTIONS 1 & 2: 2
SUM OF ALL RESPONSES TO PHQ QUESTIONS 1-9: 2
2. FEELING DOWN, DEPRESSED OR HOPELESS: NOT AT ALL
SUM OF ALL RESPONSES TO PHQ QUESTIONS 1-9: 2

## 2024-08-12 NOTE — PROGRESS NOTES
SUBJECTIVE:    Alberto Campa is a 59 y.o. male who presents for a follow up visit.    Chief Complaint   Patient presents with    Medicare AWV    Follow-up        Hypertension  This is a chronic problem. The current episode started more than 1 year ago. The problem is controlled. Associated symptoms include chest pain (chest wall pain), neck pain and shortness of breath (CARTAGENA). Pertinent negatives include no palpitations. Risk factors for coronary artery disease include male gender and sedentary lifestyle. Past treatments include beta blockers. The current treatment provides moderate improvement. Compliance problems include exercise and diet.  There is no history of angina or CVA.   Atrial Fibrillation  Presents for follow-up visit. Symptoms include chest pain (chest wall pain), hypertension and shortness of breath (CARTAGENA). Symptoms are negative for dizziness, hypotension, pacemaker problem, palpitations, syncope, tachycardia and weakness. The symptoms have been stable.   Lung cancer  Initially noted in July 2021.  A definitive diagnosis made in February 2022 and staging done in April 2022.  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  He is status post left lung removal.  He initially had lost 30 pounds over 6 months.  He has gained back a good bit of weight.  As of March of this year he weighed 253 pounds.  An MRI of the chest in March of this year at  showed stable postsurgical appearance of the left hemithorax without evidence of residual or recurrent disease.  Also had some probable postradiation changes in the left chest wall. He has neuropathy and takes gabapentin.        Patient's medications, allergies, past medical,surgical, social and family histories were reviewed and updated as appropriate.     Past Medical History:   Diagnosis Date    A-fib (HCC)     Cancer (HCC)     Chronic left shoulder pain     HTN (hypertension)      Past Surgical History:   Procedure Laterality Date 
SUBJECTIVE:    Alberto Campa is a 59 y.o. male who presents for a follow up visit.    No chief complaint on file.       HPI     Patient's medications, allergies, past medical,surgical, social and family histories were reviewed and updated as appropriate.     Past Medical History:   Diagnosis Date    A-fib (HCC)     Cancer (HCC)     Chronic left shoulder pain     HTN (hypertension)      Past Surgical History:   Procedure Laterality Date    CT NEEDLE BIOPSY LUNG PERCUTANEOUS  07/20/2021    CT NEEDLE BIOPSY LUNG PERCUTANEOUS 7/20/2021 [facility] CT SCAN    CYST REMOVAL      March 2022    EXCISION OF FACIAL MASS Right 03/24/2017    right cheek    IR PORT PLACEMENT EQUAL OR GREATER THAN 5 YEARS  5/2/2022    IR PORT PLACEMENT EQUAL OR GREATER THAN 5 YEARS [facility]    KNEE ARTHROSCOPY Right 1993    LUNG REMOVAL, TOTAL Left      Family History   Problem Relation Age of Onset    High Blood Pressure Mother     Cancer Father     Diabetes Father     High Blood Pressure Father     High Cholesterol Father     Heart Disease Father      Social History     Tobacco Use    Smoking status: Never    Smokeless tobacco: Former     Types: Chew   Substance Use Topics    Alcohol use: Yes     Alcohol/week: 12.0 standard drinks of alcohol     Types: 12 Cans of beer per week      No Known Allergies  Current Outpatient Medications on File Prior to Visit   Medication Sig Dispense Refill    gabapentin (NEURONTIN) 300 MG capsule TAKE 1 CAPSULE BY MOUTH IN THE MORNING AND AT BEDTIME 180 capsule 3    metoprolol tartrate (LOPRESSOR) 25 MG tablet TAKE 1/2 TABLET TWICE A DAY BY MOUTH 90 tablet 1     No current facility-administered medications on file prior to visit.        Review of Systems    OBJECTIVE:    There were no vitals taken for this visit.   Physical Exam    ASSESSMENT/PLAN:    Diagnoses and all orders for this visit:    Primary hypertension    Paroxysmal atrial fibrillation (HCC)    Liposarcoma 
SUBJECTIVE:    Alberto Campa is a 59 y.o. male who presents for a follow up visit.    No chief complaint on file.       Hypertension  This is a chronic problem. The current episode started more than 1 year ago. The problem is controlled. Pertinent negatives include no chest pain, palpitations or shortness of breath. Risk factors for coronary artery disease include male gender and sedentary lifestyle. Past treatments include beta blockers. The current treatment provides moderate improvement. Compliance problems include exercise and diet.  There is no history of angina or CVA.   Atrial Fibrillation  Presents for follow-up visit. Symptoms include hypertension. Symptoms are negative for chest pain, dizziness, hypotension, pacemaker problem, palpitations, shortness of breath, syncope, tachycardia and weakness. The symptoms have been stable.        Patient's medications, allergies, past medical,surgical, social and family histories were reviewed and updated as appropriate.     Past Medical History:   Diagnosis Date    A-fib (HCC)     Cancer (HCC)     Chronic left shoulder pain     HTN (hypertension)      Past Surgical History:   Procedure Laterality Date    CT NEEDLE BIOPSY LUNG PERCUTANEOUS  07/20/2021    CT NEEDLE BIOPSY LUNG PERCUTANEOUS 7/20/2021 [facility] CT SCAN    CYST REMOVAL      March 2022    EXCISION OF FACIAL MASS Right 03/24/2017    right cheek    IR PORT PLACEMENT EQUAL OR GREATER THAN 5 YEARS  5/2/2022    IR PORT PLACEMENT EQUAL OR GREATER THAN 5 YEARS [facility]    KNEE ARTHROSCOPY Right 1993    LUNG REMOVAL, TOTAL Left      Family History   Problem Relation Age of Onset    High Blood Pressure Mother     Cancer Father     Diabetes Father     High Blood Pressure Father     High Cholesterol Father     Heart Disease Father      Social History     Tobacco Use    Smoking status: Never    Smokeless tobacco: Former     Types: Chew   Substance Use Topics    Alcohol use: Yes     
SUBJECTIVE:    Alberto Campa is a 59 y.o. male who presents for a follow up visit.    No chief complaint on file.       Hypertension  This is a chronic problem. The current episode started more than 1 year ago. The problem is controlled. Pertinent negatives include no chest pain, palpitations or shortness of breath. Risk factors for coronary artery disease include male gender and sedentary lifestyle. Past treatments include beta blockers. The current treatment provides significant improvement. Compliance problems include exercise.    Atrial Fibrillation  Presents for follow-up visit. Symptoms include hypertension. Symptoms are negative for chest pain, dizziness, palpitations, shortness of breath, syncope, tachycardia and weakness. The symptoms have been stable.        Patient's medications, allergies, past medical,surgical, social and family histories were reviewed and updated as appropriate.     Past Medical History:   Diagnosis Date    A-fib (HCC)     Cancer (HCC)     Chronic left shoulder pain     HTN (hypertension)      Past Surgical History:   Procedure Laterality Date    CT NEEDLE BIOPSY LUNG PERCUTANEOUS  07/20/2021    CT NEEDLE BIOPSY LUNG PERCUTANEOUS 7/20/2021 [facility] CT SCAN    CYST REMOVAL      March 2022    EXCISION OF FACIAL MASS Right 03/24/2017    right cheek    IR PORT PLACEMENT EQUAL OR GREATER THAN 5 YEARS  5/2/2022    IR PORT PLACEMENT EQUAL OR GREATER THAN 5 YEARS [facility]    KNEE ARTHROSCOPY Right 1993    LUNG REMOVAL, TOTAL Left      Family History   Problem Relation Age of Onset    High Blood Pressure Mother     Cancer Father     Diabetes Father     High Blood Pressure Father     High Cholesterol Father     Heart Disease Father      Social History     Tobacco Use    Smoking status: Never    Smokeless tobacco: Former     Types: Chew   Substance Use Topics    Alcohol use: Yes     Alcohol/week: 12.0 standard drinks of alcohol     Types: 12 Cans of beer per 
(HCC)        No follow-ups on file.    Please note portions of this note were completed with a voicerecognition program.  Efforts were made to edit the dictations but occasionally words are mis-transcribed.

## 2024-08-14 ENCOUNTER — OFFICE VISIT (OUTPATIENT)
Dept: FAMILY MEDICINE CLINIC | Age: 59
End: 2024-08-14
Payer: MEDICARE

## 2024-08-14 VITALS
WEIGHT: 264 LBS | BODY MASS INDEX: 32.83 KG/M2 | SYSTOLIC BLOOD PRESSURE: 134 MMHG | OXYGEN SATURATION: 97 % | HEART RATE: 68 BPM | HEIGHT: 75 IN | DIASTOLIC BLOOD PRESSURE: 84 MMHG | TEMPERATURE: 98 F

## 2024-08-14 DIAGNOSIS — I10 PRIMARY HYPERTENSION: Primary | ICD-10-CM

## 2024-08-14 DIAGNOSIS — C49.9 LIPOSARCOMA (HCC): ICD-10-CM

## 2024-08-14 DIAGNOSIS — I48.0 PAROXYSMAL ATRIAL FIBRILLATION (HCC): ICD-10-CM

## 2024-08-14 DIAGNOSIS — Z00.00 WELCOME TO MEDICARE PREVENTIVE VISIT: ICD-10-CM

## 2024-08-14 PROCEDURE — 3079F DIAST BP 80-89 MM HG: CPT | Performed by: FAMILY MEDICINE

## 2024-08-14 PROCEDURE — 99214 OFFICE O/P EST MOD 30 MIN: CPT | Performed by: FAMILY MEDICINE

## 2024-08-14 PROCEDURE — G0402 INITIAL PREVENTIVE EXAM: HCPCS | Performed by: FAMILY MEDICINE

## 2024-08-14 PROCEDURE — 3075F SYST BP GE 130 - 139MM HG: CPT | Performed by: FAMILY MEDICINE

## 2024-08-14 ASSESSMENT — ENCOUNTER SYMPTOMS
SHORTNESS OF BREATH: 1
BACK PAIN: 0
CONSTIPATION: 0
ABDOMINAL PAIN: 0
DIARRHEA: 0
RHINORRHEA: 1

## 2024-08-14 NOTE — TELEPHONE ENCOUNTER
Received refill request for metoprolol from [facility] pharmacy.    Last ov: 03/07/2024 [facility]    Last Refill: 02/08/2024 #90 w/ 1    Next appointment:03/05/2025 [facility]  RECALL LIST

## 2024-08-14 NOTE — PATIENT INSTRUCTIONS
9 Ways to Cut Back on Drinking  Maybe you've found yourself drinking more alcohol than you'd prefer. If you want to cut back, here are some ideas to try.    Think before you drink.  Do you really want a drink, or is it just a habit? If you're used to having a drink at a certain time, try doing something else then.     Look for substitutes.  Find some no-alcohol drinks that you enjoy, like flavored seltzer water, tea with honey, or tonic with a slice of lime. Or try alcohol-free beer or \"virgin\" cocktails (without the alcohol).     Drink more water.  Use water to quench your thirst. Drink a glass of water before you have any alcohol. Have another glass along with every drink or between drinks.     Shrink your drink.  For example, have a bottle of beer instead of a pint. Use a smaller glass for wine. Choose drinks with lower alcohol content (ABV%). Or use less liquor and more mixer in cocktails.     Slow down.  It's easy to drink quickly and without thinking about it. Pay attention, and make each drink last longer.     Do the math.  Total up how much you spend on alcohol each month. How much is that a year? If you cut back, what could you do with the money you save?     Take a break.  Choose a day or two each week when you won't drink at all. Notice how you feel on those days, physically and emotionally. How did you sleep? Do you feel better? Over time, add more break days.     Count calories.  Would you like to lose some weight? For some people that's a good motivator for cutting back. Figure out how many calories are in each drink. How many does that add up to in a day? In a week? In a month?     Practice saying no.  Be ready when someone offers you a drink. Try: \"Thanks, I've had enough.\" Or \"Thanks, but I'm cutting back.\" Or \"No, thanks. I feel better when I drink less.\"   Current as of: November 15, 2023  Content Version: 14.1  © 9810-7112 Healthwise, [facility].   Care instructions adapted under license  [employer]

## 2025-02-06 RX ORDER — METOPROLOL TARTRATE 25 MG/1
TABLET, FILM COATED ORAL
Qty: 90 TABLET | Refills: 1 | Status: SHIPPED | OUTPATIENT
Start: 2025-02-06

## 2025-03-04 DIAGNOSIS — I10 PRIMARY HYPERTENSION: ICD-10-CM

## 2025-03-04 LAB
ALBUMIN SERPL-MCNC: 4.7 G/DL (ref 3.4–5)
ALBUMIN/GLOB SERPL: 1.5 {RATIO} (ref 1.1–2.2)
ALP SERPL-CCNC: 128 U/L (ref 40–129)
ALT SERPL-CCNC: 34 U/L (ref 10–40)
ANION GAP SERPL CALCULATED.3IONS-SCNC: 4 MMOL/L (ref 3–16)
AST SERPL-CCNC: 29 U/L (ref 15–37)
BASOPHILS # BLD: 0 K/UL (ref 0–0.2)
BASOPHILS NFR BLD: 0.9 %
BILIRUB SERPL-MCNC: 0.7 MG/DL (ref 0–1)
BUN SERPL-MCNC: 12 MG/DL (ref 7–20)
CALCIUM SERPL-MCNC: 9.8 MG/DL (ref 8.3–10.6)
CHLORIDE SERPL-SCNC: 109 MMOL/L (ref 99–110)
CO2 SERPL-SCNC: 28 MMOL/L (ref 21–32)
CREAT SERPL-MCNC: 0.8 MG/DL (ref 0.8–1.3)
DEPRECATED RDW RBC AUTO: 14.1 % (ref 12.4–15.4)
EOSINOPHIL # BLD: 0 K/UL (ref 0–0.6)
EOSINOPHIL NFR BLD: 0.9 %
GFR SERPLBLD CREATININE-BSD FMLA CKD-EPI: >90 ML/MIN/{1.73_M2}
GLUCOSE P FAST SERPL-MCNC: 106 MG/DL (ref 70–99)
HCT VFR BLD AUTO: 43.2 % (ref 40.5–52.5)
HGB BLD-MCNC: 14.6 G/DL (ref 13.5–17.5)
LYMPHOCYTES # BLD: 0.7 K/UL (ref 1–5.1)
LYMPHOCYTES NFR BLD: 13 %
MCH RBC QN AUTO: 31.4 PG (ref 26–34)
MCHC RBC AUTO-ENTMCNC: 33.7 G/DL (ref 31–36)
MCV RBC AUTO: 92.9 FL (ref 80–100)
MONOCYTES # BLD: 0.5 K/UL (ref 0–1.3)
MONOCYTES NFR BLD: 9.6 %
NEUTROPHILS # BLD: 4 K/UL (ref 1.7–7.7)
NEUTROPHILS NFR BLD: 75.6 %
PLATELET # BLD AUTO: 174 K/UL (ref 135–450)
PMV BLD AUTO: 9.5 FL (ref 5–10.5)
POTASSIUM SERPL-SCNC: 5.1 MMOL/L (ref 3.5–5.1)
PROT SERPL-MCNC: 7.8 G/DL (ref 6.4–8.2)
RBC # BLD AUTO: 4.65 M/UL (ref 4.2–5.9)
SODIUM SERPL-SCNC: 141 MMOL/L (ref 136–145)
WBC # BLD AUTO: 5.2 K/UL (ref 4–11)

## 2025-03-09 SDOH — ECONOMIC STABILITY: FOOD INSECURITY: WITHIN THE PAST 12 MONTHS, THE FOOD YOU BOUGHT JUST DIDN'T LAST AND YOU DIDN'T HAVE MONEY TO GET MORE.: NEVER TRUE

## 2025-03-09 SDOH — ECONOMIC STABILITY: FOOD INSECURITY: WITHIN THE PAST 12 MONTHS, YOU WORRIED THAT YOUR FOOD WOULD RUN OUT BEFORE YOU GOT MONEY TO BUY MORE.: NEVER TRUE

## 2025-03-09 SDOH — ECONOMIC STABILITY: INCOME INSECURITY: IN THE LAST 12 MONTHS, WAS THERE A TIME WHEN YOU WERE NOT ABLE TO PAY THE MORTGAGE OR RENT ON TIME?: NO

## 2025-03-09 SDOH — ECONOMIC STABILITY: TRANSPORTATION INSECURITY
IN THE PAST 12 MONTHS, HAS LACK OF TRANSPORTATION KEPT YOU FROM MEETINGS, WORK, OR FROM GETTING THINGS NEEDED FOR DAILY LIVING?: NO

## 2025-03-09 SDOH — ECONOMIC STABILITY: TRANSPORTATION INSECURITY
IN THE PAST 12 MONTHS, HAS THE LACK OF TRANSPORTATION KEPT YOU FROM MEDICAL APPOINTMENTS OR FROM GETTING MEDICATIONS?: NO

## 2025-03-09 ASSESSMENT — PATIENT HEALTH QUESTIONNAIRE - PHQ9
2. FEELING DOWN, DEPRESSED OR HOPELESS: NOT AT ALL
SUM OF ALL RESPONSES TO PHQ QUESTIONS 1-9: 1
SUM OF ALL RESPONSES TO PHQ QUESTIONS 1-9: 1
2. FEELING DOWN, DEPRESSED OR HOPELESS: NOT AT ALL
1. LITTLE INTEREST OR PLEASURE IN DOING THINGS: SEVERAL DAYS
SUM OF ALL RESPONSES TO PHQ QUESTIONS 1-9: 1
SUM OF ALL RESPONSES TO PHQ QUESTIONS 1-9: 1
1. LITTLE INTEREST OR PLEASURE IN DOING THINGS: SEVERAL DAYS
SUM OF ALL RESPONSES TO PHQ9 QUESTIONS 1 & 2: 1

## 2025-03-09 NOTE — PROGRESS NOTES
SUBJECTIVE:    Alberto Campa is a 59 y.o. male who presents for a follow up visit.    Chief Complaint   Patient presents with    Hypertension     Lab review         Atrial Fibrillation  Presents for follow-up visit. Symptoms include chest pain, hypertension and shortness of breath (CARTAGENA). Symptoms are negative for dizziness and palpitations.   Hypertension  This is a chronic problem. The problem is controlled. Associated symptoms include chest pain and shortness of breath (CARTAGENA). Pertinent negatives include no headaches or palpitations. Risk factors for coronary artery disease include obesity, sedentary lifestyle and male gender. Past treatments include beta blockers. The current treatment provides significant improvement. Compliance problems include exercise and diet.    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  Patient had been coughing a great deal and he coughed so hard that he passed out.  He went to the hospital and a chest x-ray on 7/14/2021 showed a large mass in the left lower lobe.  It showed a large pleural-based left lung mass with major fissure measuring 10 x 10.1 x 15.7 cm there was left hilar lymphadenopathy without other significant mediastinal or right hilar adenopathy.  He later underwent left lung removal.  He continues to follow-up with oncology.  He was last seen by Dr. Morrow in November of last year.  His MRIs have been stable and now he is getting an MRI yearly.      Patient's medications, allergies, past medical,surgical, social and family histories were reviewed and updated as appropriate.     Past Medical History:   Diagnosis Date    A-fib (HCC)     Anxiety Recently    Due to cancer diagnosis - upcoming surgery    Atrial fibrillation (HCC) February 2022    Due to surgery removing left lung    Cancer (HCC)     Chronic left shoulder pain     Depression Recently    Due to cancer diagnosis - upcoming surgery    HTN (hypertension)      Past

## 2025-03-12 ENCOUNTER — OFFICE VISIT (OUTPATIENT)
Dept: FAMILY MEDICINE CLINIC | Age: 60
End: 2025-03-12
Payer: MEDICARE

## 2025-03-12 VITALS
SYSTOLIC BLOOD PRESSURE: 138 MMHG | BODY MASS INDEX: 33.94 KG/M2 | OXYGEN SATURATION: 96 % | HEIGHT: 75 IN | WEIGHT: 273 LBS | DIASTOLIC BLOOD PRESSURE: 84 MMHG | HEART RATE: 96 BPM | RESPIRATION RATE: 16 BRPM

## 2025-03-12 DIAGNOSIS — G89.29 CHRONIC LEFT SHOULDER PAIN: ICD-10-CM

## 2025-03-12 DIAGNOSIS — I48.0 PAROXYSMAL ATRIAL FIBRILLATION (HCC): ICD-10-CM

## 2025-03-12 DIAGNOSIS — Z12.11 ENCOUNTER FOR SCREENING COLONOSCOPY: ICD-10-CM

## 2025-03-12 DIAGNOSIS — E78.00 PURE HYPERCHOLESTEROLEMIA: ICD-10-CM

## 2025-03-12 DIAGNOSIS — C49.9 LIPOSARCOMA (HCC): ICD-10-CM

## 2025-03-12 DIAGNOSIS — I10 PRIMARY HYPERTENSION: Primary | ICD-10-CM

## 2025-03-12 DIAGNOSIS — M25.512 CHRONIC LEFT SHOULDER PAIN: ICD-10-CM

## 2025-03-12 PROCEDURE — 99214 OFFICE O/P EST MOD 30 MIN: CPT | Performed by: FAMILY MEDICINE

## 2025-03-12 PROCEDURE — 3075F SYST BP GE 130 - 139MM HG: CPT | Performed by: FAMILY MEDICINE

## 2025-03-12 PROCEDURE — 3079F DIAST BP 80-89 MM HG: CPT | Performed by: FAMILY MEDICINE

## 2025-03-12 RX ORDER — GABAPENTIN 300 MG/1
CAPSULE ORAL
Qty: 180 CAPSULE | Refills: 3 | Status: SHIPPED | OUTPATIENT
Start: 2025-03-12 | End: 2025-11-19

## 2025-03-12 ASSESSMENT — ENCOUNTER SYMPTOMS
DIARRHEA: 0
RHINORRHEA: 0
BACK PAIN: 0
ABDOMINAL PAIN: 0
CONSTIPATION: 0
SHORTNESS OF BREATH: 1

## 2025-03-25 NOTE — PROGRESS NOTES
[facility]   Electrophysiology  Carlos Hutchins PA-C  Attending EP: Alex    Date: 3/25/2025  I had the privilege of visiting Alberto Campa in the office.     Chief Complaint: No chief complaint on file.    History of Present Illness: History obtained from patient and medical record.    Alberto Campa is 59 y.o. male with a past medical history of L lung myxoid liposarcoma, HTN    S/p L pneumonectomy with cryoablation 2/2022 ([facility]). Had new onset AF after surgery. Was not started on AC, discharged on Lopressor.    Monitor 3/2022 showed 7% AF burden    Interval history: Today, Alberto Campa is being seen for follow up on atrial fibrillation    Has consistent SOB related to his surgery. Has not had palpitations, dizziness, lightheadedness.    Denies having chest pain, palpitations, shortness of breath, orthopnea/PND, cough, or dizziness at the time of this visit.    With regard to medication therapy the patient has been compliant with prescribed regimen. They have tolerated therapy to date.       Assessment:    Paroxysmal atrial fibrillation  - In atrial fibrillation vs flutter, HR appears to be controlled but is borderline on EKG  - Asymptomatic  - 3/2022 monitor with 7% burden  - [id number] score of 1 (HTN), high risk for stroke and thromboembolism, not on anticoagulation. Will start Eliquis 5 mg BID since pt is in AF currently, long term anticoagulation not indicated  - Switch to Toprol 25 mg daily. Discussed monitoring HR with pulse ox at home  - Discussed treatment options, including cardioversion, ablation, rate control, and rhythm control  - Counseled on risk factors for Afib, benefits of losing weight and regular exercise  - Plan for cardioversion in 3-4 weeks on anticoagulation since he is rate controlled.  - Discussed having ablation since he has recurrence. Will revisit at follow up  - Will get updated echo since last was in 2022 and had poor visualization     HTN  - BP goal

## 2025-03-27 ENCOUNTER — OFFICE VISIT (OUTPATIENT)
Dept: CARDIOLOGY CLINIC | Age: 60
End: 2025-03-27

## 2025-03-27 ENCOUNTER — TELEPHONE (OUTPATIENT)
Dept: CARDIOLOGY CLINIC | Age: 60
End: 2025-03-27

## 2025-03-27 VITALS
OXYGEN SATURATION: 98 % | SYSTOLIC BLOOD PRESSURE: 146 MMHG | BODY MASS INDEX: 33.82 KG/M2 | HEIGHT: 75 IN | HEART RATE: 76 BPM | DIASTOLIC BLOOD PRESSURE: 86 MMHG | WEIGHT: 272 LBS

## 2025-03-27 DIAGNOSIS — I48.0 PAROXYSMAL ATRIAL FIBRILLATION (HCC): Primary | ICD-10-CM

## 2025-03-27 RX ORDER — METOPROLOL SUCCINATE 25 MG/1
25 TABLET, EXTENDED RELEASE ORAL DAILY
Qty: 90 TABLET | Refills: 1 | Status: SHIPPED | OUTPATIENT
Start: 2025-03-27

## 2025-03-27 NOTE — PATIENT INSTRUCTIONS
Start metoprolol long acting to control heart rate in atrial fibrillation. Monitor your heart rate a few times a day on pulse oximeter. Heart rate should be between .     Start Eliquis to prevent stroke in atrial fibrillation, will need to be on this following cardioversion    Plan for cardioversion in 3-4 weeks on Eliquis when it is safe to perform regarding risk of a clot in your heart. You may convert to normal rhythm before then on your own    Get echo after cardioversion (plan for 4 weeks out at least)    Will discuss ablation at later point to prevent recurrence, will most likely recommend this after cardioversion since you have had recurrence.

## 2025-03-27 NOTE — TELEPHONE ENCOUNTER
You are scheduled for a cardioversion.    Our  will call you to discuss a date for your procedure.    The day of your procedure you will need to check in at the Registration Desk in the Main Lobby.    PRE-PROCEDURE INSTRUCTIONS   Do not eat or drink anything after midnight the night before your procedure.   Take all your medications with a sip of water in the morning.   Do not hold your Eliquis   Please have a responsible adult to drive you home after your procedure.    If you have any questions regarding your procedure itself or your medications, please call [phone] and ask to speak to an EP nurse.

## 2025-03-28 NOTE — TELEPHONE ENCOUNTER
Procedure: DCCV    Date Of Procedure:  04/29/25    Time Of Arrival: 930AM    Procedure Time: 1030AM       Called and spoke to patient and he is agreeable to the date and time. Reviewed the Pre-Procedure instructions and they verbalized understanding. Encouraged to call with any questions or concerns.       Published on [place] / emailed to Cath lab / note in chart / scheduled in Epic/Cupid/Carto   Z Plasty Text: The lesion was extirpated to the level of the fat with a #15 scalpel blade.  Given the location of the defect, shape of the defect and the proximity to free margins a Z-plasty was deemed most appropriate for repair.  Using a sterile surgical marker, the appropriate transposition arms of the Z-plasty were drawn incorporating the defect and placing the expected incisions within the relaxed skin tension lines where possible.    The area thus outlined was incised deep to adipose tissue with a #15 scalpel blade.  The skin margins were undermined to an appropriate distance in all directions utilizing iris scissors.  The opposing transposition arms were then transposed into place in opposite direction and anchored with interrupted buried subcutaneous sutures.

## 2025-04-29 ENCOUNTER — HOSPITAL ENCOUNTER (OUTPATIENT)
Age: 60
Discharge: HOME | End: 2025-05-01
Attending: INTERNAL MEDICINE
Payer: MEDICARE

## 2025-04-29 VITALS
RESPIRATION RATE: 20 BRPM | HEART RATE: 86 BPM | SYSTOLIC BLOOD PRESSURE: 129 MMHG | OXYGEN SATURATION: 97 % | TEMPERATURE: 97.8 F | HEIGHT: 75 IN | WEIGHT: 273 LBS | BODY MASS INDEX: 33.94 KG/M2 | DIASTOLIC BLOOD PRESSURE: 98 MMHG

## 2025-04-29 DIAGNOSIS — I48.0 PAROXYSMAL ATRIAL FIBRILLATION (HCC): ICD-10-CM

## 2025-04-29 PROCEDURE — 2500000003 HC RX 250 WO HCPCS: Performed by: INTERNAL MEDICINE

## 2025-04-29 PROCEDURE — 7100000011 HC PHASE II RECOVERY - ADDTL 15 MIN: Performed by: INTERNAL MEDICINE

## 2025-04-29 PROCEDURE — 92960 CARDIOVERSION ELECTRIC EXT: CPT

## 2025-04-29 PROCEDURE — 7100000010 HC PHASE II RECOVERY - FIRST 15 MIN: Performed by: INTERNAL MEDICINE

## 2025-04-29 PROCEDURE — 99152 MOD SED SAME PHYS/QHP 5/>YRS: CPT | Performed by: INTERNAL MEDICINE

## 2025-04-29 PROCEDURE — 92960 CARDIOVERSION ELECTRIC EXT: CPT | Performed by: INTERNAL MEDICINE

## 2025-04-29 RX ADMIN — METHOHEXITAL SODIUM 50 MG: 500 INJECTION, POWDER, LYOPHILIZED, FOR SOLUTION INTRAMUSCULAR; INTRAVENOUS; RECTAL at 10:15

## 2025-04-29 RX ADMIN — METHOHEXITAL SODIUM 30 MG: 500 INJECTION, POWDER, LYOPHILIZED, FOR SOLUTION INTRAMUSCULAR; INTRAVENOUS; RECTAL at 10:30

## 2025-04-29 NOTE — DISCHARGE INSTRUCTIONS
[facility] DISCHARGE INSTRUCTIONS    No driving for 24 hours. We strongly recommend that a responsible adult stay with you for the next 24 hours.    Continue Eliquis  .    Hydrocortisone 1% cream to reddened areas as needed.     Phone: [phone]

## 2025-04-29 NOTE — H&P
[facility]   Electrophysiology        Chief Complaint: No chief complaint on file.    History of Present Illness: History obtained from patient and medical record.    Alberto Campa is 59 y.o. male with a past medical history of L lung myxoid liposarcoma, HTN    S/p L pneumonectomy with cryoablation 2/2022 (Formerly [facility]). Had new onset AF after surgery. Was not started on AC, discharged on Lopressor.    Monitor 3/2022 showed 7% AF burden    Interval history: Today, Alberto Campa is being seen for follow up on atrial fibrillation    Has consistent SOB related to his surgery. Has not had palpitations, dizziness, lightheadedness.    Denies having chest pain, palpitations, shortness of breath, orthopnea/PND, cough, or dizziness at the time of this visit.    With regard to medication therapy the patient has been compliant with prescribed regimen. They have tolerated therapy to date.       Assessment:    Paroxysmal atrial fibrillation  - In atrial fibrillation vs flutter, HR appears to be controlled but is borderline on EKG  - Asymptomatic  - 3/2022 monitor with 7% burden  - [id number] score of 1 (HTN), high risk for stroke and thromboembolism, not on anticoagulation. Will start Eliquis 5 mg BID since pt is in AF currently, long term anticoagulation not indicated  - Switch to Toprol 25 mg daily. Discussed monitoring HR with pulse ox at home  - Discussed treatment options, including cardioversion, ablation, rate control, and rhythm control  - Counseled on risk factors for Afib, benefits of losing weight and regular exercise  - Plan for cardioversion in 3-4 weeks on anticoagulation since he is rate controlled.  - Discussed having ablation since he has recurrence. Will revisit at follow up  - Will get updated echo since last was in 2022 and had poor visualization     HTN  - BP goal <130/80   ~BP today 146/86  - Encouraged to monitor BP at home  - Recommended low salt diet to help with BP control  -

## 2025-04-30 ENCOUNTER — TELEPHONE (OUTPATIENT)
Dept: CARDIOLOGY CLINIC | Age: 60
End: 2025-04-30

## 2025-04-30 DIAGNOSIS — I48.0 PAROXYSMAL ATRIAL FIBRILLATION (HCC): Primary | ICD-10-CM

## 2025-04-30 LAB — ECHO BSA: 2.56 M2

## 2025-04-30 RX ORDER — METOPROLOL SUCCINATE 25 MG/1
50 TABLET, EXTENDED RELEASE ORAL DAILY
Qty: 90 TABLET | Refills: 1 | Status: SHIPPED | OUTPATIENT
Start: 2025-04-30

## 2025-04-30 NOTE — TELEPHONE ENCOUNTER
ATRIAL FIBRILLATION ABLATION INFORMATION    Our  will be contacting you with a date and time for your procedure    Lab work is required, please ensure this is completed within the 30 days prior to your procedure.     PRE-PROCEDURE INSTRUCTIONS -   -Do not eat or drink anything after midnight the night before your ablation.  -The morning of your ablation you will need to check in at the registration desk in the main lobby.  -You will need to have a responsible adult to drive you home.  -Your nurse will provide you with discharge instructions.  -You will need to hold your Eliquis for the day before and morning of   -Stop taking GLP-1 (Mounjaro, Ozempic, Trulicity, Zepbound, Wegovy, Vitoza) one week before your procedure.  -Stop SGLT2 (Jardiance, Farxiga, Invokana, Brenzavvy, Steglatro) the day before your procedure and the morning of your procedure.  -If you are taking a diuretic (water pill) please hold the morning of the procedure  -If you take long acting insulin, take 1/2 the dose the evening before or morning of depending on when you take your dosage.  -You may take all of your other medications with a sip of water.    You will be scheduled for a 6-8 week follow up with cardiology.  This will be done prior to your discharge instructions.    If you have any questions regarding the procedure itself or medications, please call [phone] and ask to speak to an EP nurse.

## 2025-04-30 NOTE — TELEPHONE ENCOUNTER
----- Message from Dr. Mikel Johnson MD sent at 4/30/2025  8:52 AM EDT -----  Please schedule him for A-fib ablation after mid July

## 2025-05-01 ENCOUNTER — HOSPITAL ENCOUNTER (OUTPATIENT)
Age: 60
Discharge: HOME | End: 2025-05-03
Payer: MEDICARE

## 2025-05-01 VITALS
BODY MASS INDEX: 33.94 KG/M2 | WEIGHT: 273 LBS | SYSTOLIC BLOOD PRESSURE: 133 MMHG | HEIGHT: 75 IN | DIASTOLIC BLOOD PRESSURE: 88 MMHG

## 2025-05-01 DIAGNOSIS — I48.0 PAROXYSMAL ATRIAL FIBRILLATION (HCC): ICD-10-CM

## 2025-05-01 LAB
ECHO AO ASC DIAM: 4 CM
ECHO AO ASCENDING AORTA INDEX: 1.6 CM/M2
ECHO AO ROOT DIAM: 4.1 CM
ECHO AO ROOT INDEX: 1.64 CM/M2
ECHO AV AREA PEAK VELOCITY: 2.5 CM2
ECHO AV AREA VTI: 3.2 CM2
ECHO AV AREA/BSA PEAK VELOCITY: 1 CM2/M2
ECHO AV AREA/BSA VTI: 1.3 CM2/M2
ECHO AV MEAN GRADIENT: 3 MMHG
ECHO AV MEAN VELOCITY: 0.7 M/S
ECHO AV PEAK GRADIENT: 5 MMHG
ECHO AV PEAK VELOCITY: 1.1 M/S
ECHO AV VELOCITY RATIO: 0.73
ECHO AV VTI: 20 CM
ECHO BSA: 2.56 M2
ECHO EST RA PRESSURE: 15 MMHG
ECHO IVC INSP: 1.7 CM
ECHO IVC PROX: 2.8 CM
ECHO LA AREA 2C: 19.4 CM2
ECHO LA AREA 4C: 18.7 CM2
ECHO LA MAJOR AXIS: 6 CM
ECHO LA MINOR AXIS: 6.2 CM
ECHO LA VOL BP: 49 ML (ref 18–58)
ECHO LA VOL MOD A2C: 50 ML (ref 18–58)
ECHO LA VOL MOD A4C: 47 ML (ref 18–58)
ECHO LA VOL/BSA BIPLANE: 20 ML/M2 (ref 16–34)
ECHO LA VOLUME INDEX MOD A2C: 20 ML/M2 (ref 16–34)
ECHO LA VOLUME INDEX MOD A4C: 19 ML/M2 (ref 16–34)
ECHO LV E' LATERAL VELOCITY: 6.3 CM/S
ECHO LV E' SEPTAL VELOCITY: 8.72 CM/S
ECHO LV EDV A2C: 131 ML
ECHO LV EDV A4C: 185 ML
ECHO LV EDV INDEX A4C: 74 ML/M2
ECHO LV EDV NDEX A2C: 52 ML/M2
ECHO LV EF PHYSICIAN: 54 %
ECHO LV EJECTION FRACTION A2C: 54 %
ECHO LV EJECTION FRACTION A4C: 52 %
ECHO LV EJECTION FRACTION BIPLANE: 55 % (ref 55–100)
ECHO LV ESV A2C: 61 ML
ECHO LV ESV A4C: 89 ML
ECHO LV ESV INDEX A2C: 24 ML/M2
ECHO LV ESV INDEX A4C: 36 ML/M2
ECHO LV FRACTIONAL SHORTENING: 30 % (ref 28–44)
ECHO LV INTERNAL DIMENSION DIASTOLE INDEX: 2.12 CM/M2
ECHO LV INTERNAL DIMENSION DIASTOLIC: 5.3 CM (ref 4.2–5.9)
ECHO LV INTERNAL DIMENSION SYSTOLIC INDEX: 1.48 CM/M2
ECHO LV INTERNAL DIMENSION SYSTOLIC: 3.7 CM
ECHO LV IVSD: 1 CM (ref 0.6–1)
ECHO LV MASS 2D: 213.9 G (ref 88–224)
ECHO LV MASS INDEX 2D: 85.6 G/M2 (ref 49–115)
ECHO LV POSTERIOR WALL DIASTOLIC: 1.1 CM (ref 0.6–1)
ECHO LV RELATIVE WALL THICKNESS RATIO: 0.42
ECHO LVOT AREA: 3.5 CM2
ECHO LVOT AV VTI INDEX: 0.94
ECHO LVOT DIAM: 2.1 CM
ECHO LVOT MEAN GRADIENT: 2 MMHG
ECHO LVOT PEAK GRADIENT: 3 MMHG
ECHO LVOT PEAK VELOCITY: 0.8 M/S
ECHO LVOT STROKE VOLUME INDEX: 25.9 ML/M2
ECHO LVOT SV: 64.7 ML
ECHO LVOT VTI: 18.7 CM
ECHO MV A VELOCITY: 0.46 M/S
ECHO MV E VELOCITY: 0.89 M/S
ECHO MV E/A RATIO: 1.93
ECHO MV E/E' LATERAL: 14.13
ECHO MV E/E' RATIO (AVERAGED): 12.17
ECHO MV E/E' SEPTAL: 10.21
ECHO PV MAX VELOCITY: 0.8 M/S
ECHO PV PEAK GRADIENT: 2 MMHG
ECHO RA AREA 4C: 22.6 CM2
ECHO RA END SYSTOLIC VOLUME APICAL 4 CHAMBER INDEX BSA: 27 ML/M2
ECHO RA VOLUME: 68 ML
ECHO RV BASAL DIMENSION: 3.9 CM
ECHO RV FREE WALL PEAK S': 12 CM/S
ECHO RV LONGITUDINAL DIMENSION: 8 CM
ECHO RV MID DIMENSION: 2.6 CM
ECHO RV TAPSE: 1.7 CM (ref 1.7–?)
EKG DIAGNOSIS: NORMAL
EKG Q-T INTERVAL: 394 MS
EKG QRS DURATION: 136 MS
EKG QTC CALCULATION (BAZETT): 481 MS
EKG R AXIS: -75 DEGREES
EKG T AXIS: 85 DEGREES
EKG VENTRICULAR RATE: 90 BPM

## 2025-05-01 PROCEDURE — 6360000004 HC RX CONTRAST MEDICATION

## 2025-05-01 PROCEDURE — C8929 TTE W OR WO FOL WCON,DOPPLER: HCPCS

## 2025-05-01 RX ADMIN — SULFUR HEXAFLUORIDE 2 ML: 60.7; .19; .19 INJECTION, POWDER, LYOPHILIZED, FOR SUSPENSION INTRAVENOUS; INTRAVESICAL at 12:30

## 2025-05-13 PROBLEM — I48.91 A-FIB (HCC): Status: ACTIVE | Noted: 2025-04-30

## 2025-06-09 RX ORDER — METOPROLOL SUCCINATE 25 MG/1
50 TABLET, EXTENDED RELEASE ORAL DAILY
Qty: 180 TABLET | Refills: 1 | Status: SHIPPED | OUTPATIENT
Start: 2025-06-09

## 2025-06-25 ENCOUNTER — TELEPHONE (OUTPATIENT)
Dept: CARDIOLOGY CLINIC | Age: 60
End: 2025-06-25

## 2025-06-25 NOTE — TELEPHONE ENCOUNTER
Spoke with patient regarding appointment today. He is s/p DCCV from 4/2025 with an AF ablation already scheduled for 8/2025. Patient states he is doing well and has a good understanding of AF ablation. He is requesting refills for Eliquis. Appointment will be cancelled as a plan of care has alreay been discussed. Patient instructed to contact office for any questions or concerns.

## 2025-08-04 DIAGNOSIS — I48.0 PAROXYSMAL ATRIAL FIBRILLATION (HCC): ICD-10-CM

## 2025-08-04 LAB
ANION GAP SERPL CALCULATED.3IONS-SCNC: 11 MMOL/L (ref 3–16)
BUN SERPL-MCNC: 15 MG/DL (ref 7–20)
CALCIUM SERPL-MCNC: 9.5 MG/DL (ref 8.3–10.6)
CHLORIDE SERPL-SCNC: 102 MMOL/L (ref 99–110)
CO2 SERPL-SCNC: 26 MMOL/L (ref 21–32)
CREAT SERPL-MCNC: 0.8 MG/DL (ref 0.8–1.3)
DEPRECATED RDW RBC AUTO: 14.3 % (ref 12.4–15.4)
GFR SERPLBLD CREATININE-BSD FMLA CKD-EPI: >90 ML/MIN/{1.73_M2}
GLUCOSE SERPL-MCNC: 99 MG/DL (ref 70–99)
HCT VFR BLD AUTO: 39.8 % (ref 40.5–52.5)
HGB BLD-MCNC: 14 G/DL (ref 13.5–17.5)
MCH RBC QN AUTO: 31 PG (ref 26–34)
MCHC RBC AUTO-ENTMCNC: 35.1 G/DL (ref 31–36)
MCV RBC AUTO: 88.5 FL (ref 80–100)
PLATELET # BLD AUTO: 149 K/UL (ref 135–450)
PMV BLD AUTO: 9.5 FL (ref 5–10.5)
POTASSIUM SERPL-SCNC: 4.5 MMOL/L (ref 3.5–5.1)
RBC # BLD AUTO: 4.5 M/UL (ref 4.2–5.9)
SODIUM SERPL-SCNC: 139 MMOL/L (ref 136–145)
WBC # BLD AUTO: 4.4 K/UL (ref 4–11)

## 2025-08-11 ENCOUNTER — TELEPHONE (OUTPATIENT)
Dept: CARDIOLOGY CLINIC | Age: 60
End: 2025-08-11

## 2025-08-26 ENCOUNTER — TELEPHONE (OUTPATIENT)
Dept: FAMILY MEDICINE CLINIC | Age: 60
End: 2025-08-26

## 2025-08-29 DIAGNOSIS — I10 PRIMARY HYPERTENSION: ICD-10-CM

## 2025-08-29 DIAGNOSIS — E78.00 PURE HYPERCHOLESTEROLEMIA: ICD-10-CM

## 2025-08-29 LAB
ALBUMIN SERPL-MCNC: 4.4 G/DL (ref 3.4–5)
ALBUMIN/GLOB SERPL: 1.3 {RATIO} (ref 1.1–2.2)
ALP SERPL-CCNC: 142 U/L (ref 40–129)
ALT SERPL-CCNC: 31 U/L (ref 10–40)
ANION GAP SERPL CALCULATED.3IONS-SCNC: 11 MMOL/L (ref 3–16)
AST SERPL-CCNC: 30 U/L (ref 15–37)
BASOPHILS # BLD: 0 K/UL (ref 0–0.2)
BASOPHILS NFR BLD: 0.5 %
BILIRUB SERPL-MCNC: 1 MG/DL (ref 0–1)
BUN SERPL-MCNC: 10 MG/DL (ref 7–20)
CALCIUM SERPL-MCNC: 9.5 MG/DL (ref 8.3–10.6)
CHLORIDE SERPL-SCNC: 106 MMOL/L (ref 99–110)
CHOLEST SERPL-MCNC: 134 MG/DL (ref 0–199)
CO2 SERPL-SCNC: 25 MMOL/L (ref 21–32)
CREAT SERPL-MCNC: 0.8 MG/DL (ref 0.8–1.3)
DEPRECATED RDW RBC AUTO: 14.2 % (ref 12.4–15.4)
EOSINOPHIL # BLD: 0.1 K/UL (ref 0–0.6)
EOSINOPHIL NFR BLD: 1.1 %
GFR SERPLBLD CREATININE-BSD FMLA CKD-EPI: >90 ML/MIN/{1.73_M2}
GLUCOSE P FAST SERPL-MCNC: 101 MG/DL (ref 70–99)
HCT VFR BLD AUTO: 40.6 % (ref 40.5–52.5)
HDLC SERPL-MCNC: 40 MG/DL (ref 40–60)
HGB BLD-MCNC: 13.7 G/DL (ref 13.5–17.5)
LDL CHOLESTEROL: 81 MG/DL
LYMPHOCYTES # BLD: 0.5 K/UL (ref 1–5.1)
LYMPHOCYTES NFR BLD: 11 %
MCH RBC QN AUTO: 30.5 PG (ref 26–34)
MCHC RBC AUTO-ENTMCNC: 33.8 G/DL (ref 31–36)
MCV RBC AUTO: 90.3 FL (ref 80–100)
MONOCYTES # BLD: 0.4 K/UL (ref 0–1.3)
MONOCYTES NFR BLD: 8.9 %
NEUTROPHILS # BLD: 3.8 K/UL (ref 1.7–7.7)
NEUTROPHILS NFR BLD: 78.5 %
PLATELET # BLD AUTO: 158 K/UL (ref 135–450)
PMV BLD AUTO: 9.8 FL (ref 5–10.5)
POTASSIUM SERPL-SCNC: 4.2 MMOL/L (ref 3.5–5.1)
PROT SERPL-MCNC: 7.7 G/DL (ref 6.4–8.2)
RBC # BLD AUTO: 4.5 M/UL (ref 4.2–5.9)
SODIUM SERPL-SCNC: 142 MMOL/L (ref 136–145)
TRIGL SERPL-MCNC: 66 MG/DL (ref 0–150)
TSH SERPL DL<=0.005 MIU/L-ACNC: 3.66 UIU/ML (ref 0.27–4.2)
VLDLC SERPL CALC-MCNC: 13 MG/DL
WBC # BLD AUTO: 4.8 K/UL (ref 4–11)